# Patient Record
Sex: FEMALE | Race: BLACK OR AFRICAN AMERICAN | Employment: UNEMPLOYED | URBAN - METROPOLITAN AREA
[De-identification: names, ages, dates, MRNs, and addresses within clinical notes are randomized per-mention and may not be internally consistent; named-entity substitution may affect disease eponyms.]

---

## 2018-10-18 LAB
ANTIBODY SCREEN, EXTERNAL: NEGATIVE
ANTIBODY SCREEN, EXTERNAL: NEGATIVE
CHLAMYDIA, EXTERNAL: NEGATIVE
HBSAG, EXTERNAL: NEGATIVE
HIV, EXTERNAL: NEGATIVE
RPR, EXTERNAL: NORMAL
RUBELLA, EXTERNAL: NORMAL
RUBELLA, EXTERNAL: NORMAL
TYPE, ABO & RH, EXTERNAL: NORMAL
URINALYSIS, EXTERNAL: NEGATIVE

## 2019-01-10 ENCOUNTER — OP HISTORICAL/CONVERTED ENCOUNTER (OUTPATIENT)
Dept: OTHER | Age: 24
End: 2019-01-10

## 2019-01-25 LAB — GTT, 1 HR, GLUCOLA, EXTERNAL: 135

## 2019-04-04 LAB — GRBS, EXTERNAL: POSITIVE

## 2019-05-01 ENCOUNTER — HOSPITAL ENCOUNTER (INPATIENT)
Age: 24
LOS: 3 days | Discharge: HOME | End: 2019-05-04
Attending: STUDENT IN AN ORGANIZED HEALTH CARE EDUCATION/TRAINING PROGRAM | Admitting: OBSTETRICS & GYNECOLOGY
Payer: COMMERCIAL

## 2019-05-01 ENCOUNTER — ANESTHESIA EVENT (OUTPATIENT)
Dept: LABOR AND DELIVERY | Age: 24
End: 2019-05-01
Payer: COMMERCIAL

## 2019-05-01 ENCOUNTER — ANESTHESIA (OUTPATIENT)
Dept: LABOR AND DELIVERY | Age: 24
End: 2019-05-01
Payer: COMMERCIAL

## 2019-05-01 DIAGNOSIS — Z34.90 PREGNANCY, UNSPECIFIED GESTATIONAL AGE: Primary | ICD-10-CM

## 2019-05-01 LAB
A1 MICROGLOB PLACENTAL VAG QL: POSITIVE
CONTROL LINE PRESENT?: NORMAL
DAILY QC (YES/NO)?: YES
ERYTHROCYTE [DISTWIDTH] IN BLOOD BY AUTOMATED COUNT: 15.3 % (ref 11.5–14.5)
EXPIRATION DATE: NORMAL
HCT VFR BLD AUTO: 28.6 % (ref 35–47)
HGB BLD-MCNC: 8.9 G/DL (ref 11.5–16)
INTERNAL NEGATIVE CONTROL: NORMAL
KIT LOT NO.: NORMAL
MCH RBC QN AUTO: 25.1 PG (ref 26–34)
MCHC RBC AUTO-ENTMCNC: 31.1 G/DL (ref 30–36.5)
MCV RBC AUTO: 80.6 FL (ref 80–99)
NRBC # BLD: 0 K/UL (ref 0–0.01)
NRBC BLD-RTO: 0 PER 100 WBC
PH, VAGINAL FLUID: 4 (ref 5–6.1)
PLATELET # BLD AUTO: 146 K/UL (ref 150–400)
PMV BLD AUTO: 12.3 FL (ref 8.9–12.9)
RBC # BLD AUTO: 3.55 M/UL (ref 3.8–5.2)
WBC # BLD AUTO: 8.3 K/UL (ref 3.6–11)

## 2019-05-01 PROCEDURE — 74011000258 HC RX REV CODE- 258: Performed by: OBSTETRICS & GYNECOLOGY

## 2019-05-01 PROCEDURE — 99285 EMERGENCY DEPT VISIT HI MDM: CPT

## 2019-05-01 PROCEDURE — 76060000078 HC EPIDURAL ANESTHESIA: Performed by: ANESTHESIOLOGY

## 2019-05-01 PROCEDURE — 74011000250 HC RX REV CODE- 250

## 2019-05-01 PROCEDURE — 75410000000 HC DELIVERY VAGINAL/SINGLE: Performed by: OBSTETRICS & GYNECOLOGY

## 2019-05-01 PROCEDURE — 74011250636 HC RX REV CODE- 250/636: Performed by: OBSTETRICS & GYNECOLOGY

## 2019-05-01 PROCEDURE — 74011250636 HC RX REV CODE- 250/636: Performed by: ANESTHESIOLOGY

## 2019-05-01 PROCEDURE — 74011000250 HC RX REV CODE- 250: Performed by: ANESTHESIOLOGY

## 2019-05-01 PROCEDURE — 85027 COMPLETE CBC AUTOMATED: CPT

## 2019-05-01 PROCEDURE — 65270000029 HC RM PRIVATE

## 2019-05-01 PROCEDURE — 77030014125 HC TY EPDRL BBMI -B: Performed by: ANESTHESIOLOGY

## 2019-05-01 PROCEDURE — 84112 EVAL AMNIOTIC FLUID PROTEIN: CPT | Performed by: STUDENT IN AN ORGANIZED HEALTH CARE EDUCATION/TRAINING PROGRAM

## 2019-05-01 PROCEDURE — 59025 FETAL NON-STRESS TEST: CPT

## 2019-05-01 PROCEDURE — 75410000002 HC LABOR FEE PER 1 HR: Performed by: OBSTETRICS & GYNECOLOGY

## 2019-05-01 PROCEDURE — 4A1H74Z MONITORING OF PRODUCTS OF CONCEPTION, CARDIAC ELECTRICAL ACTIVITY, VIA NATURAL OR ARTIFICIAL OPENING: ICD-10-PCS | Performed by: OBSTETRICS & GYNECOLOGY

## 2019-05-01 PROCEDURE — 83986 ASSAY PH BODY FLUID NOS: CPT | Performed by: STUDENT IN AN ORGANIZED HEALTH CARE EDUCATION/TRAINING PROGRAM

## 2019-05-01 PROCEDURE — 77030034850

## 2019-05-01 PROCEDURE — 36415 COLL VENOUS BLD VENIPUNCTURE: CPT

## 2019-05-01 PROCEDURE — 75410000003 HC RECOV DEL/VAG/CSECN EA 0.5 HR: Performed by: OBSTETRICS & GYNECOLOGY

## 2019-05-01 PROCEDURE — 77010026065 HC OXYGEN MINIMUM MEDICAL AIR: Performed by: OBSTETRICS & GYNECOLOGY

## 2019-05-01 RX ORDER — OXYTOCIN/RINGER'S LACTATE 20/1000 ML
2 PLASTIC BAG, INJECTION (ML) INTRAVENOUS
Status: DISCONTINUED | OUTPATIENT
Start: 2019-05-01 | End: 2019-05-01

## 2019-05-01 RX ORDER — OXYTOCIN/0.9 % SODIUM CHLORIDE 30/500 ML
2 PLASTIC BAG, INJECTION (ML) INTRAVENOUS
Status: DISCONTINUED | OUTPATIENT
Start: 2019-05-01 | End: 2019-05-04 | Stop reason: HOSPADM

## 2019-05-01 RX ORDER — FENTANYL/BUPIVACAINE/NS/PF 2-1250MCG
1-16 PREFILLED PUMP RESERVOIR EPIDURAL CONTINUOUS
Status: DISCONTINUED | OUTPATIENT
Start: 2019-05-01 | End: 2019-05-02 | Stop reason: HOSPADM

## 2019-05-01 RX ORDER — ONDANSETRON 2 MG/ML
4 INJECTION INTRAMUSCULAR; INTRAVENOUS
Status: DISCONTINUED | OUTPATIENT
Start: 2019-05-01 | End: 2019-05-02 | Stop reason: HOSPADM

## 2019-05-01 RX ORDER — NALOXONE HYDROCHLORIDE 0.4 MG/ML
0.4 INJECTION, SOLUTION INTRAMUSCULAR; INTRAVENOUS; SUBCUTANEOUS AS NEEDED
Status: DISCONTINUED | OUTPATIENT
Start: 2019-05-01 | End: 2019-05-02 | Stop reason: HOSPADM

## 2019-05-01 RX ORDER — FENTANYL/BUPIVACAINE/NS/PF 2-1250MCG
1-16 PREFILLED PUMP RESERVOIR EPIDURAL CONTINUOUS
Status: DISCONTINUED | OUTPATIENT
Start: 2019-05-01 | End: 2019-05-01

## 2019-05-01 RX ORDER — LIDOCAINE HYDROCHLORIDE AND EPINEPHRINE 20; 5 MG/ML; UG/ML
INJECTION, SOLUTION EPIDURAL; INFILTRATION; INTRACAUDAL; PERINEURAL AS NEEDED
Status: DISCONTINUED | OUTPATIENT
Start: 2019-05-01 | End: 2019-05-02 | Stop reason: HOSPADM

## 2019-05-01 RX ORDER — FENTANYL/BUPIVACAINE/NS/PF 2-1250MCG
1-16 PREFILLED PUMP RESERVOIR EPIDURAL CONTINUOUS
Status: DISCONTINUED | OUTPATIENT
Start: 2019-05-01 | End: 2019-05-01 | Stop reason: SDUPTHER

## 2019-05-01 RX ORDER — NALOXONE HYDROCHLORIDE 0.4 MG/ML
0.4 INJECTION, SOLUTION INTRAMUSCULAR; INTRAVENOUS; SUBCUTANEOUS ONCE
Status: ACTIVE | OUTPATIENT
Start: 2019-05-01 | End: 2019-05-01

## 2019-05-01 RX ORDER — MAG HYDROX/ALUMINUM HYD/SIMETH 200-200-20
30 SUSPENSION, ORAL (FINAL DOSE FORM) ORAL
Status: DISCONTINUED | OUTPATIENT
Start: 2019-05-01 | End: 2019-05-02 | Stop reason: HOSPADM

## 2019-05-01 RX ORDER — ACETAMINOPHEN 325 MG/1
650 TABLET ORAL
Status: DISCONTINUED | OUTPATIENT
Start: 2019-05-01 | End: 2019-05-02 | Stop reason: HOSPADM

## 2019-05-01 RX ORDER — SODIUM CHLORIDE, SODIUM LACTATE, POTASSIUM CHLORIDE, CALCIUM CHLORIDE 600; 310; 30; 20 MG/100ML; MG/100ML; MG/100ML; MG/100ML
125 INJECTION, SOLUTION INTRAVENOUS CONTINUOUS
Status: DISCONTINUED | OUTPATIENT
Start: 2019-05-01 | End: 2019-05-04 | Stop reason: HOSPADM

## 2019-05-01 RX ORDER — NALOXONE HYDROCHLORIDE 0.4 MG/ML
0.4 INJECTION, SOLUTION INTRAMUSCULAR; INTRAVENOUS; SUBCUTANEOUS ONCE
Status: ACTIVE | OUTPATIENT
Start: 2019-05-01 | End: 2019-05-02

## 2019-05-01 RX ADMIN — PENICILLIN G POTASSIUM 2.5 MILLION UNITS: 20000000 POWDER, FOR SOLUTION INTRAVENOUS at 15:30

## 2019-05-01 RX ADMIN — SODIUM CHLORIDE, SODIUM LACTATE, POTASSIUM CHLORIDE, AND CALCIUM CHLORIDE 125 ML/HR: 600; 310; 30; 20 INJECTION, SOLUTION INTRAVENOUS at 23:07

## 2019-05-01 RX ADMIN — PENICILLIN G POTASSIUM 2.5 MILLION UNITS: 20000000 POWDER, FOR SOLUTION INTRAVENOUS at 19:32

## 2019-05-01 RX ADMIN — SODIUM CHLORIDE, SODIUM LACTATE, POTASSIUM CHLORIDE, AND CALCIUM CHLORIDE 1000 ML: 600; 310; 30; 20 INJECTION, SOLUTION INTRAVENOUS at 17:25

## 2019-05-01 RX ADMIN — OXYTOCIN-SODIUM CHLORIDE 0.9% IV SOLN 30 UNIT/500ML 2 MILLI-UNITS/MIN: 30-0.9/5 SOLUTION at 22:00

## 2019-05-01 RX ADMIN — PENICILLIN G POTASSIUM 2.5 MILLION UNITS: 20000000 POWDER, FOR SOLUTION INTRAVENOUS at 23:03

## 2019-05-01 RX ADMIN — SODIUM CHLORIDE 5 MILLION UNITS: 900 INJECTION, SOLUTION INTRAVENOUS at 11:26

## 2019-05-01 RX ADMIN — Medication 10 ML/HR: at 18:57

## 2019-05-01 RX ADMIN — LIDOCAINE HYDROCHLORIDE AND EPINEPHRINE 10 ML: 20; 5 INJECTION, SOLUTION EPIDURAL; INFILTRATION; INTRACAUDAL; PERINEURAL at 18:57

## 2019-05-01 NOTE — ANESTHESIA PROCEDURE NOTES
Epidural Block    Start time: 5/1/2019 6:22 PM  End time: 5/1/2019 7:01 PM  Performed by: Fili Rodarte MD  Authorized by: Fili Rodarte MD     Pre-Procedure  Indication: labor epidural    Preanesthetic Checklist: patient identified, risks and benefits discussed, anesthesia consent, patient being monitored, timeout performed and anesthesia consent    Timeout Time: 18:22        Epidural:   Patient position:  Seated  Prep region:  Lumbar  Prep: Betadine    Location:  L3-4    Needle and Epidural Catheter:   Needle Type:  Tuohy  Needle Gauge:  17 G  Injection Technique:  Loss of resistance using air  Attempts:  2  Catheter Size:  20 G  Catheter at Skin Depth (cm):  10  Depth in Epidural Space (cm):  5  Events: no blood with aspiration, no cerebrospinal fluid with aspiration and negative aspiration test    Test Dose:  Lidocaine 1.5% w/ epi and negative    Assessment:   Catheter Secured:  Tape  Insertion:  Uncomplicated  Patient tolerance:  Patient tolerated the procedure well with no immediate complications  Epidural placement interrupted multiple times for extended periods due to pt movement, twisting, unable to sit still. Difficult epidural to place due to repeated movement and requests to take  needle out.

## 2019-05-01 NOTE — ANESTHESIA PREPROCEDURE EVALUATION
Relevant Problems No relevant active problems Anesthetic History No history of anesthetic complications Review of Systems / Medical History Patient summary reviewed and pertinent labs reviewed Pulmonary Within defined limits Neuro/Psych Psychiatric history Comments: Previous history of depression, however no longer on any meds Cardiovascular Within defined limits Exercise tolerance: >4 METS 
  
GI/Hepatic/Renal 
Within defined limits Pertinent negatives: No GERD Endo/Other Anemia Other Findings Comments: G1 at 41 weeks, induction. No comps with pregnancy Anemia - hgb 8.9 PLt ct 146K Physical Exam 
 
Airway Mallampati: II 
TM Distance: 4 - 6 cm Neck ROM: normal range of motion Mouth opening: Normal 
 
 Cardiovascular Regular rate and rhythm,  S1 and S2 normal,  no murmur, click, rub, or gallop Rhythm: regular Rate: normal 
 
 
 
 Dental 
No notable dental hx Pulmonary Breath sounds clear to auscultation Abdominal 
 
 
 
 Other Findings Anesthetic Plan ASA: 2 Anesthesia type: epidural 
 
 
 
 
 
Anesthetic plan and risks discussed with: Patient

## 2019-05-01 NOTE — PROGRESS NOTES
0700  Pt arrived with c/o possible SROM. Did not wear pad from home. No current LOF. EFM on by Fannie Carvalho. 0740  Nitrazine negative for ROM (pH4.0) 
0745  Amnisure done. Blood on swab. Amnisure positive with bloody sample. 0904  Dr. Maye Pulliam in. Updated on pt's status. Aware that pt is GBS + and ROM could not be confirmed. SVE by MD.  No evident LOF with exam.  Per MD, have pt walk after reactive NST with pt wearing peripad. 0845  Pt up to amb. 1000  Light red blood on pad without any fluid appearance. EFM on. Dr. Luli Washington notified. Order received to admit pt. 
1050  IV started by SAKINA Fang. 1130  PCN loading dose initiated. 36  Dr. Luli Washington in to eval pt. Cx change noted by MD.  MD stated she will reeval in a few hours. Lunch given. EFM on for updated fhr eval 
1500  Bedside SBAR given to TAYLA Pitts

## 2019-05-01 NOTE — PROGRESS NOTES
Labor Progress Note Patient seen, fetal heart rate and contraction pattern evaluated at 1725 Physical Exam: 
Cervical Exam was examined   /-2, intact Uterine Activity[de-identified] q2-3 min Fetal Heart Rate: Cat 1 Assessment/Plan:  at 41w0d, labor 1. Expectant mangement 2. GBS pos, PCN 3.  Plan for epidural then AROM when comfortable. Jacob Rice DO, FACOG [facility]

## 2019-05-01 NOTE — PROGRESS NOTES
Labor Progress Note Patient seen, fetal heart rate and contraction pattern evaluated at 1740. Pt comfortable with epidural. 
 
Physical Exam: 
Cervical Exam was examined   5/80/-1 AROM thick meconium Uterine Activity[de-identified] q2-3 min Fetal Heart Rate: Cat 1 Assessment/Plan: 41w0d labor, GBS + 1. Expectant mangement 2. GBS positive.

## 2019-05-01 NOTE — H&P
History & Physical 
 
Name: Marley Wagner MRN: [id number]  SSN: [id number] YOB: 1995  Age: 26 y.o. Sex: female Subjective:  
 
Estimated Date of Delivery: 19 OB History Gudelmi [place] 1 Para Term  AB Living SAB  
   
 TAB Ectopic Molar Multiple Live Births Ms. Sera Medrano is a  admitted with pregnancy at 41w0d for Increasingly painful contractions. Prenatal course was normal. Please see prenatal records for details. Pt presented to unit this am with possible ROM. Negative nitrazine, + amnisure but + blood. No active LOF after walking with pad. Pt CE changed 1 to 3 cm, admit for labor. Past Medical History:  
Diagnosis Date  Abnormal Papanicolaou smear of cervix  Acne  Depression   
 v Hx medications prior to pregnancy No past surgical history on file. Social History Occupational History  Not on file Tobacco Use  Smoking status: Never Smoker  Smokeless tobacco: Never Used Substance and Sexual Activity  Alcohol use: No  
 Drug use: No  
 Sexual activity: Never Family History Problem Relation Age of Onset  Heart Disease Maternal Grandmother No Known Allergies Prior to Admission medications Medication Sig Start Date End Date Taking? Authorizing Provider  
cloNIDine HCl (CATAPRES) 0.2 mg tablet Take 0.2 mg by mouth nightly. Provider, Historical  
benztropine (COGENTIN) 0.5 mg tablet Take 0.5 mg by mouth nightly. Provider, Historical  
carBAMazepine (TEGRETOL) 100 mg chewable tablet Take 400 mg by mouth daily. Provider, Historical  
OLANZapine (ZYPREXA) 20 mg tablet Take 20 mg by mouth nightly. Provider, Historical  
OLANZapine (ZYPREXA) 5 mg tablet Take  by mouth nightly. Provider, Historical  
sertraline (ZOLOFT) 100 mg tablet Take  by mouth daily.     Provider, Historical  
 levonorgestrel-ethinyl estradiol (LUTERA, 28,) 0.1-20 mg-mcg per tablet Take  by mouth. Provider, Historical  
ibuprofen (MOTRIN) 600 mg tablet Take 1 Tab by mouth every six (6) hours as needed for Pain. 12/26/13   Lisa Santacruz MD  
  
 
Review of Systems: A comprehensive review of systems was negative except for that written in the HPI. Objective:  
 
Vitals: 
Vitals:  
 05/01/19 2060 05/01/19 0827 05/01/19 2156 05/01/19 1009 BP: 134/80   117/77 Pulse: 72   70 Resp:    16 Temp:  98.2 °F (36.8 °C)  98 °F (36.7 °C) Weight:   67.1 kg (148 lb) Height:   5' 2\" (1.575 m) Physical Exam: 
General NAD Abdm: gravid NT 
JUSTINE LE NT w/o edema Concow: q 3 min Membranes:  Intact Fetal Heart Rate: Cat 1 
SVE: 1/th/high upon arrival to unit at 800am, now 3/50/-2 EFW: 7 lbs Prenatal Labs:  
Lab Results Component Value Date/Time  
 Rubella, External Immune 10/18/2018 Rubella, External Immune 10/18/2018 GrBStrep, External POSITIVE 04/04/2019 HBsAg, External negative 10/18/2018 HIV, External negative 10/18/2018 RPR, External NR 10/18/2018 Chlamydia, External Negative 10/18/2018 Assessment/Plan:  
 
Plan: Admit for Labor  Continue expectant management. Group B Strep was positive, will treat prophylactically with penicillin Pt was scheduled for IOL this evening. Expectant management, will recheck later this afternoon. Signed By:  Tari Orozco,  May 1, 2019

## 2019-05-01 NOTE — PROGRESS NOTES
1515:  Received SBAR and bedsdie report from Mallorie Dong, NUVIA; assuming care of pt with IV access admitted for labor at 3/50/-2 per Dr Estevan Chappell exam at [phone]. PCN due at 1530, pt off monitor at this time 1530:  PCN up at this time. 1545:  EFM applied for IFM. Purposeful round 1610:  Visitor at bedside; Purposeful round 1634:  Pt sitting upright in bed; EFM adjusted, Purposeful round 1710:  EFM adjusted, Purposeful round, Pt to right tilt 1716:  UP to BR at this time 1725:  Dr Estevan Chappell at bedside, SVE 4/70/-2. Bolusing for epidural  
1750:  EFM adjusted; Bolus continues to infuse, orders requested from Dr Katina Michaels 1800:  EFM adjusted; Bed to upright cahir postioin;  Epidural cart to bedside. 1832:  Time out for Epidural, see Intra-procedure flowchart 1859:  Right lateral after epidural, [id number] ball, pillows efm adjusted 
1900:  Epidural Pump settings verified with Dr Sukhdeep Elias at 10 basal, bolus of 4 every 10 min, lockout of 5 per hour. Order modified. 1915:  Bedside SBAR to Nicol Foy RN; relinquishing care at this time. 1921:  Call to Dr Estevan Chappell to update on pt status, left VM. 
1932:  Dr Estevan Chappell on floor, verbal update provided.

## 2019-05-01 NOTE — PROGRESS NOTES
05/01/19 3:37 PM 
CM met with patient to complete initial assessment and to begin discharge planning. Demographics were reviewed and confirmed. Patient lives with her mother, Cecilia Panchal ([phone]), who will be her main support and will also have the 4th baby band. Neither FOB nor his family will be involved in the care of the baby. Patient noted that her mom, her dad Maritza Gottlieb, and her maternal side of her family will assist her when she and the baby get home. Patient works at [employer] and will have 2 months off from work. Patient plans to breastfeed and has a pump to use at home. She has elected to use Dr. Alma Mari for pediatric care for the baby. Patient has car seat, crib, clothing, and other necessary supplies. She has [facility] services already set up through [facility] and knows to call after she has the baby to schedule an enrollment appt for the baby. She also has Medicaid and knows that the baby will be added to this coverage. Patient noted that she has  assistance through [address] [place] [address] for childcare for when she returns to work. Patient denied any additional needs at this time. Care Management Interventions PCP Verified by CM: Yes Mode of Transport at Discharge: Self Transition of Care Consult (CM Consult): Discharge Planning Current Support Network: Family Lives Nearby, Relative's Home(Live with mother) Confirm Follow Up Transport: Family Plan discussed with Pt/Family/Caregiver: Yes Discharge Location Discharge Placement: Home with family assistance SHILPI Bustillos

## 2019-05-02 PROCEDURE — 65270000029 HC RM PRIVATE

## 2019-05-02 PROCEDURE — 75410000002 HC LABOR FEE PER 1 HR: Performed by: OBSTETRICS & GYNECOLOGY

## 2019-05-02 PROCEDURE — 74011000250 HC RX REV CODE- 250: Performed by: ANESTHESIOLOGY

## 2019-05-02 PROCEDURE — 0KQM0ZZ REPAIR PERINEUM MUSCLE, OPEN APPROACH: ICD-10-PCS | Performed by: OBSTETRICS & GYNECOLOGY

## 2019-05-02 PROCEDURE — 74011250636 HC RX REV CODE- 250/636: Performed by: OBSTETRICS & GYNECOLOGY

## 2019-05-02 PROCEDURE — 74011250637 HC RX REV CODE- 250/637: Performed by: OBSTETRICS & GYNECOLOGY

## 2019-05-02 PROCEDURE — 77030021125

## 2019-05-02 RX ORDER — DIPHENHYDRAMINE HCL 25 MG
25 CAPSULE ORAL
Status: DISCONTINUED | OUTPATIENT
Start: 2019-05-02 | End: 2019-05-04 | Stop reason: HOSPADM

## 2019-05-02 RX ORDER — SODIUM CHLORIDE 0.9 % (FLUSH) 0.9 %
5-40 SYRINGE (ML) INJECTION AS NEEDED
Status: DISCONTINUED | OUTPATIENT
Start: 2019-05-02 | End: 2019-05-04 | Stop reason: HOSPADM

## 2019-05-02 RX ORDER — IBUPROFEN 800 MG/1
800 TABLET ORAL EVERY 8 HOURS
Status: DISCONTINUED | OUTPATIENT
Start: 2019-05-02 | End: 2019-05-04 | Stop reason: HOSPADM

## 2019-05-02 RX ORDER — SWAB
1 SWAB, NON-MEDICATED MISCELLANEOUS DAILY
Status: DISCONTINUED | OUTPATIENT
Start: 2019-05-03 | End: 2019-05-04 | Stop reason: HOSPADM

## 2019-05-02 RX ORDER — PEPPERMINT OIL
SPIRIT ORAL
Status: DISPENSED
Start: 2019-05-02 | End: 2019-05-02

## 2019-05-02 RX ORDER — OXYTOCIN/RINGER'S LACTATE 20/1000 ML
125-500 PLASTIC BAG, INJECTION (ML) INTRAVENOUS ONCE
Status: ACTIVE | OUTPATIENT
Start: 2019-05-02 | End: 2019-05-03

## 2019-05-02 RX ORDER — SIMETHICONE 80 MG
80 TABLET,CHEWABLE ORAL
Status: DISCONTINUED | OUTPATIENT
Start: 2019-05-02 | End: 2019-05-04 | Stop reason: HOSPADM

## 2019-05-02 RX ORDER — SODIUM CHLORIDE 0.9 % (FLUSH) 0.9 %
5-40 SYRINGE (ML) INJECTION EVERY 8 HOURS
Status: DISCONTINUED | OUTPATIENT
Start: 2019-05-02 | End: 2019-05-04 | Stop reason: HOSPADM

## 2019-05-02 RX ORDER — DOCUSATE SODIUM 100 MG/1
100 CAPSULE, LIQUID FILLED ORAL
Status: DISCONTINUED | OUTPATIENT
Start: 2019-05-02 | End: 2019-05-04 | Stop reason: HOSPADM

## 2019-05-02 RX ORDER — SODIUM CHLORIDE, SODIUM LACTATE, POTASSIUM CHLORIDE, CALCIUM CHLORIDE 600; 310; 30; 20 MG/100ML; MG/100ML; MG/100ML; MG/100ML
125 INJECTION, SOLUTION INTRAVENOUS CONTINUOUS
Status: DISCONTINUED | OUTPATIENT
Start: 2019-05-02 | End: 2019-05-04 | Stop reason: HOSPADM

## 2019-05-02 RX ORDER — HYDROCORTISONE ACETATE PRAMOXINE HCL 2.5; 1 G/100G; G/100G
CREAM TOPICAL AS NEEDED
Status: DISCONTINUED | OUTPATIENT
Start: 2019-05-02 | End: 2019-05-04 | Stop reason: HOSPADM

## 2019-05-02 RX ORDER — NALOXONE HYDROCHLORIDE 0.4 MG/ML
0.4 INJECTION, SOLUTION INTRAMUSCULAR; INTRAVENOUS; SUBCUTANEOUS AS NEEDED
Status: DISCONTINUED | OUTPATIENT
Start: 2019-05-02 | End: 2019-05-04 | Stop reason: HOSPADM

## 2019-05-02 RX ORDER — OXYCODONE AND ACETAMINOPHEN 5; 325 MG/1; MG/1
1 TABLET ORAL
Status: DISCONTINUED | OUTPATIENT
Start: 2019-05-02 | End: 2019-05-04 | Stop reason: HOSPADM

## 2019-05-02 RX ADMIN — PENICILLIN G POTASSIUM 2.5 MILLION UNITS: 20000000 POWDER, FOR SOLUTION INTRAVENOUS at 02:56

## 2019-05-02 RX ADMIN — DOCUSATE SODIUM 100 MG: 100 CAPSULE, LIQUID FILLED ORAL at 12:20

## 2019-05-02 RX ADMIN — IBUPROFEN 800 MG: 800 TABLET ORAL at 21:15

## 2019-05-02 RX ADMIN — IBUPROFEN 800 MG: 800 TABLET ORAL at 12:21

## 2019-05-02 RX ADMIN — Medication 10 ML/HR: at 03:05

## 2019-05-02 NOTE — ROUTINE PROCESS
Bedside and Verbal shift change report given to KATY Santos RN (oncoming nurse) by Jamie Caro RN (offgoing nurse). Report included the following information SBAR, Kardex, Procedure Summary, Intake/Output, MAR and Recent Results.

## 2019-05-02 NOTE — PROGRESS NOTES
Labor Progress Note Patient seen, fetal heart rate and contraction pattern evaluated, patient examined. Patient Vitals for the past 2 hrs: 
 BP Temp Pulse Resp SpO2  
05/02/19 0052     100 % 05/02/19 0050     91 % 05/02/19 0047     98 % 05/02/19 0042     98 % 05/02/19 0038 123/72 97.9 °F (36.6 °C) 64 16   
05/02/19 0037     98 % 05/02/19 0032     98 % 05/02/19 0027     99 % 05/02/19 0024 119/75  64    
05/02/19 0022     98 % 05/02/19 0017     98 % 05/02/19 0012     98 % 05/02/19 0009 120/78  66    
05/02/19 0003     94 % Physical Exam: 
Cervical Exam:  c/pushing Uterine Activity: q 2-3 min Fetal Heart Rate: Cat 2 with good recovery between ctx. Assessment/Plan: 
 
Reassuring fetal status, Continue plan for vaginal delivery GBS pos, PCN Raeann Flores DO, FACOG [facility]

## 2019-05-02 NOTE — ROUTINE PROCESS
1900 Bedside and Verbal shift change report given to FOSTER Barnett RN (oncoming nurse) by Augusta Parekh RN (offgoing nurse). Report included the following information . Patient assessment completed. Patient is post epidural placement, comfortable. 56  Dr Kaylee Johnson is at bedside evaluation patient progress. SVE 5/80/-2. AROM  Thick meconium noted. NICU notified, they will be at delivery per MD request.  
 
2005  Joyner catheter inserted, draining well. Patient tolerated the procedure well. 2136  SVE by Dr. Kaylee Johnson 5/80/-2, unchanged. 2138  IUPC placed by Dr. Kaylee Johnson. Pitocin ordered. 2200  Pitocin started at 2 estrella-units 2230  Patient is busted up and turned to right side. Pitocin increased. 2325 Some variables noticed, patient turned to left side 0130  SVE 10/100/+1 by Solomon Salter 5832  Dr Kaylee Johnson notified. Per Dr. Kaylee Johnson, start pushing. 0138  Patients legs are in stirrups, patient is educated on how to push. Patient is baring down with every contraction. 0988  Dr Kaylee Johnson at bedside evaluating patient progress and reviewing the strip with FHR. Patient is baring down with every push. [address]  Dr Kaylee Johnson is leaving the room, patient will keep pushing. 0242  Stopped pushing. Patient is placed into upright sitting position. 5527  Patient is placed into left \"running man. \"  Hip release. 6242  Lateral right position, running man.   
 
0400 Patient is in lithotomy position, with legs in stirrups. Patient is baring down with every contraction. 200  Dr Kaylee Johnson is at bedside assessing patient progress. Keep pushing. 4386  Dr Junior Glaser. Patient will be pushing. 3377  Patient is stating \"I cant do this\", Patient is thrashing in the bed stating that she had enough. Patient wisps off BP cull. Laura Atkinson PCT in room to help. 
 
2734 Patient pushing again. This RN and Alton Ritchie PCT are encouraging the patient to push. [address] [facility] in room demonstrating how to use a towel to pull as pushing. 2369  Dr Alessandro Medina returns to room to evaluate progress. Dr Alessandro Medina is at bedside ready for delivery. Patient is baring down with each contraction. 9721  Viable male infant born via . Baby has bee taken to warmer for suctioning due to meconium. 0602  Dr Queen Rota repairing a 2nd degree laceration with stiches and wound packing. and is leaving the room. QBL 200ml  
 
0700  Bedside and Verbal shift change report given to RN (oncoming nurse) by Byron Quinn RN (offgoing nurse). Report included the following information SBAR, Kardex, Procedure Summary, Intake/Output, MAR, Accordion, Recent Results and Med Rec Status.

## 2019-05-02 NOTE — L&D DELIVERY NOTE
Delivery Note:     Patient reached FD and pushed with good effort to deliver the fetal head in OA position. no nuchal cord found. The anterior shoulder, followed by the posterior shoulder and the rest of the body then delivered easily. This was a boy with Apgars of 9 and 9 at 1 and 5 minutes respectively, weight pending. The infant was not stimulated and quickly handed to waiting nicu staff for suction after cord was doubly clamped and cut. The placenta followed spontaneously, intact, with 3VC. Pitocin was added to the IVF and the fundus was firm to palpation. The vagina, cervix and perineum were examined and 2nd degree perineal laceration with extension to left labia and vaginal wall was found repaired using 2-0 and 3-0 suture. Despite suture there was a small amount of bleeding still noted in the vagina that was made worse by manipulation. The vagina was packed and howard replaced, to be removed later today.  mL. No complications. Mom and baby doing well. Dr. Naya Miller delivering. [facility], [address] [place] [address],[address] [facility]    Delivery Summary    Patient: Adrián Sheridan MRN: [id number]  SSN: [id number]    YOB: 1995  Age: 26 y.o. Sex: female       Information for the patient's :  Crystal Oates, Male Qings Ronit [[id number]]       Labor Events:    Labor: No    Steroids: None   Cervical Ripening Date/Time:       Cervical Ripening Type: None   Antibiotics During Labor: Yes   Rupture Identifier:      Rupture Date/Time: 2019 7:48 PM   Rupture Type: AROM   Amniotic Fluid Volume: Moderate    Amniotic Fluid Description: Meconium    Amniotic Fluid Odor: None    Induction:         Induction Date/Time:        Indications for Induction:      Augmentation: Oxytocin   Augmentation Date/Time:      Indications for Augmentation:     Labor complications:          Additional complications:        Delivery Events:  Indications For Episiotomy:     Episiotomy: None   Perineal Laceration(s): 2nd   Repaired:     Periurethral Laceration Location:      Repaired:     Labial Laceration Location:     Repaired:     Sulcal Laceration Location:     Repaired:     Vaginal Laceration Location:     Repaired:     Cervical Laceration Location:     Repaired:     Repair Suture: Vicryl 2-0;Vicryl 3-0   Number of Repair Packets: 6   Estimated Blood Loss (ml):  ml     Delivery Date: 2019    Delivery Time: 5:09 AM  Delivery Type: Vaginal, Spontaneous  Sex:  Male    Gestational Age: 40w1d   Delivery Clinician:  Juanita Lewis  Living Status: Living   Delivery Location: L&D            APGARS  One minute Five minutes Ten minutes   Skin color: 1   1        Heart rate: 2   2        Grimace: 2   2        Muscle tone: 2   2        Breathin   2        Totals: 9   9            Presentation: Vertex    Position: Right Occiput Anterior  Resuscitation Method:  Suctioning-deep; Tactile Stimulation;Suctioning-bulb     Meconium Stained: Thick      Cord Information: 3 Vessels  Complications: None  Cord around:    Delayed cord clamping? No  Cord clamped date/time:   Disposition of Cord Blood: Discard    Blood Gases Sent?: No    Placenta:  Date/Time: 2019  5:12 AM  Removal: Spontaneous      Appearance: Normal      Measurements:  Birth Weight:        Birth Length:        Head Circumference:        Chest Circumference:       Abdominal Girth:       Other Providers:   Haylee GUERRERO;TAL WILLIAM;BIBI SOLOMON;ELIZABETH SEGURA;MARK LINDSAY;JUANA PEDRAZA;SABINE TORRES;SUE GREENE;CORTNEY MANRIQUE, Obstetrician;Primary Nurse;Primary  Nurse;Tech;Neonatologist;Anesthesiologist;Nursery Nurse;Tech;Charge Nurse           Group B Strep:   Lab Results   Component Value Date/Time    Alison, External POSITIVE 2019     Information for the patient's :  Joann Gillespie Male Patti Mitchell [[id number]]   No results found for: ABORH, PCTABR, PCTDIG, BILI, ABORHEXT, ABORH    No results for input(s): PCO2CB, PO2CB, HCO3I, SO2I, IBD, PTEMPI, SPECTI, PHICB, ISITE, IDEV, IALLEN in the last 72 hours.

## 2019-05-02 NOTE — LACTATION NOTE
This note was copied from a baby's chart. Mother states she tried to breastfeed baby after delivery but baby not interested. She was able to hand express 10 drops at 0830 and then she requested formula (hannah took 20 ml at 1130). She did not attend a breastfeeding class. LC reviewed breastfeeding basics, feeding cues and hand expression. Pt will successfully establish breastfeeding by feeding in response to infant's early feeding cues and/or to offer breast every 2-3 hours. Ways to obtain a deep latch and seek comfortable positioning shared, aware to keep log of feedings/output. Encouraged mom to attempt feeding with baby led feeding cues. Just as sucking on fingers, rooting, mouthing. Looking for 8-12 feedings in 24 hours. Don't limit baby at breast, allow baby to come of breast on it's own. Baby may want to feed  often and may increase number of feedings on second day of life. Skin to skin encouraged. If baby doesn't nurse,  Mom should  hand express  10-20 drops of colostrum and drip into baby's mouth, or give to baby by finger feeding, cup feeding, or spoon feeding at least every 2-3 hours. Discussed with mother her plan for feeding. Reviewed the benefits of exclusive breast milk feeding during the hospital stay. Informed her of the risks of using formula to supplement in the first few days of life as well as the benefits of successful breast milk feeding; referred her to the Breastfeeding booklet about this information. She acknowledges understanding of information reviewed and states that it is her plan to breastfeed  her infant. Will support her choice and offer additional information as needed. Hand Expression Education:  Mom taught how to manually hand express her colostrum.   Emphasized the importance of providing infant with valuable colostrum as infant rests skin to skin at breast.  Aware to avoid extended periods of non-feeding. Aware to offer 10-20+ drops of colostrum every 2-3 hours until infant is latching and nursing effectively. Taught the rationale behind this low tech but highly effective evidence based practice. Pt will successfully establish breastfeeding by feeding in response to early feeding cues  
or wake every 3h, will obtain deep latch, and will keep log of feedings/output. Taught to BF at hunger cues and or q 2-3 hrs and to offer 10-20 drops of hand expressed colostrum at any non-feeds. Breast Assessment Left Breast: Medium Left Nipple: Everted, Intact Right Breast: Medium Right Nipple: Everted, Intact Breast- Feeding Assessment Attends Breast-Feeding Classes: No(Discussed breastfeeding basics) Breast-Feeding Experience: No 
Breast Trauma/Surgery: No 
Type/Quality: Attempted(Mother states she tried to breastfeed after delivery but baby not interested. Baby goty 10 drops of hand expressed colostrum at 0830. ) Lactation Consultant Visits Breast-Feedings: (Baby had 20 ml of formula at 11:30 per mother's request. Reviewed feeding cues and hand expression.)

## 2019-05-02 NOTE — PROGRESS NOTES
05/02/19 
1:49 AM 
Spoke to Dr. Dameon Landaverde and informed her that the patient was completely dilated and pushing and asked her to review the FHR tracing.

## 2019-05-02 NOTE — DISCHARGE INSTRUCTIONS
Discharge Instructions for Vaginal Delivery    Patient ID:  Arabella Reynolds  [id number]  15 y.o.  1995    Take Home Medications       Continue taking your prenatal vitamins if you are breastfeeding. Follow-up care is a key part of your treatment and safety. Please schedule and keep appointments. Follow-up with your primary OB in 6 weeks. Activity  Avoid anything in your vagina for 6 weeks (no intercourse, tampons, or douching). You may drive unless you are taking prescription pain medications. Climbing stairs and light lifting are okay. Please avoid excessive exercise, though walking is okay- you'll be tired! Diet  Regular diet as tolerated. Be sure to drink plenty of fluids if you are breastfeeding. Wound care  If you have stitches, continue to rinse with a squirt bottle of warm water each time you void for about 7-10 days. .  Your stitches will gradually dissolve over four to eight weeks. Sitz baths are also helpful to keep the wound clean, encourage healing, and to help with pain associated with the stitches or hemorrhoids. You can use either a sitz bath basin or a bathtub filled with 2-3\" inches of plain warm water. Soak for 10 minutes 3 times a day as tolerated. Pain Management  1. Over the counter medications such as Tylenol and ibuprofen (Motrin or Advil) are ideal.  These may be taken together, alternating doses. You may  take the maximum dose:  Motrin or Advil (generic ibuprofen), either 3 tablets every 6 hours or 4 tablets every 8 hours or Tylenol (acetominophen) 1000mg every 6 hours (equivalent to 2 extra strength Tylenol). 2. You may also have a precrescription for stronger pain medication. Take only as needed and transition to over the counter medication in the next few days. Minimize amounts of the prescription medication, as it can be habit-forming and will worsen or cause constipation.  Most patients will find that within a couple of days, their pain is adequately controlled using only over-the-counter medications. 3. The prescription pain medication is mixed with Tylenol, therefore, you should not take any extra Tylenol or acetaminophen until you have reduced your prescription pain medication. 4. Add heating pad or sitz baths as needed. Add hemorrhoid wipes or ointments if needed    Constipation  1. Constipation is normal after pregnancy and delivery, especially while taking prescription narcotic pain medication. 2. Over the counter remedies including ducosate (Colace), take 1-2 capsules 1-2 times daily for soft stool as needed. You may also add/ try milk of magnesia or rectal remedies such as Dulcolax or Fleets enema. Recovery: What to Expect at Home  1. Fatigue is expected. Try to rest when you can and don't worry about doing housework or other tasks which can wait. 2. The soreness along your bottom will improve significantly over the first 2 weeks, but it may take 6 weeks before you are completely recovered. 3. Back pain or general body aches or muscle soreness are expected and should improve with acetominophen or ibuprofen. 4. Leg swelling due to pregnancy and/or IV fluids given in the hospital will take about two weeks to resolve. 5. Most women experience some form of the \"Baby Blues\" after having a baby. Feeling emotional, tearful, frustrated, anxious, sad, and irritable some of the time is normal and go away after about 2 weeks. Adequate rest and help from your family will help. Take breaks from caring for the baby. Call your doctor if your symptoms seem severe, last more than 2 weeks, or seem to be getting worse instead of better. Get help immediately if you have thoughts of wanting to hurt yourself or others! Call your doctor or seek immediate medical care if you have:  Heavy vaginal bleeding, soaking through one or more pads an hour for several hours. Foul-smelling discharge from your vagina or incision.   Consistent nausea and vomiting and cannot keep fluids down. Consistent pain that does not get better after you take pain medicine.   Sudden chest pain and shortness of breath  Signs of a blood clot: pain/ swelling/ increasing redness in your lower extremeties  Signs of infection: increased pain in your abdomen or vaginal area; red streaks, warmth, or tenderness of your breasts; fever of 100.5 F or greater

## 2019-05-02 NOTE — PROGRESS NOTES
Labor Progress Note Patient seen, fetal heart rate and contraction pattern evaluated, patient examined. Patient Vitals for the past 2 hrs: 
 BP Temp Pulse Resp SpO2  
05/01/19 2128     100 % 05/01/19 2124 106/55  74    
05/01/19 2123     100 % 05/01/19 2118     100 % 05/01/19 2113     100 % 05/01/19 2109 97/51  74    
05/01/19 2108     100 % 05/01/19 2103     100 % 05/01/19 2058     100 % 05/01/19 2053 111/62 98 °F (36.7 °C) 79 16 100 % 05/01/19 2048     100 % 05/01/19 2043     100 % 05/01/19 2040 97/61  75    
05/01/19 2038     100 % 05/01/19 2033     100 % 05/01/19 2028     100 % 05/01/19 2024 118/69  74    
05/01/19 2023     100 % 05/01/19 2018     100 % 05/01/19 2013     100 % 05/01/19 2009 98/58  81    
05/01/19 2008     100 % 05/01/19 2003     100 % 05/01/19 1958     99 % 05/01/19 1953 107/64  77  100 % 05/01/19 1948     100 % 05/01/19 1943     99 % Physical Exam: 
Cervical Exam:  unchanged Uterine Activity: q 6-7 min IUPC placed Fetal Heart Rate: cat 1 Assessment/Plan: 
 
Reassuring fetal status, Labor  Not progressing normally  start pitocin augmentation, Continue plan for vaginal delivery GBS positive Wong DO Brandy, FACOG [facility]

## 2019-05-03 PROCEDURE — 74011250637 HC RX REV CODE- 250/637: Performed by: OBSTETRICS & GYNECOLOGY

## 2019-05-03 PROCEDURE — 65270000029 HC RM PRIVATE

## 2019-05-03 RX ADMIN — OXYCODONE HYDROCHLORIDE AND ACETAMINOPHEN 1 TABLET: 5; 325 TABLET ORAL at 09:39

## 2019-05-03 RX ADMIN — IBUPROFEN 800 MG: 800 TABLET ORAL at 06:32

## 2019-05-03 RX ADMIN — Medication 1 TABLET: at 09:39

## 2019-05-03 RX ADMIN — IBUPROFEN 800 MG: 800 TABLET ORAL at 23:08

## 2019-05-03 RX ADMIN — IBUPROFEN 800 MG: 800 TABLET ORAL at 14:55

## 2019-05-03 RX ADMIN — DOCUSATE SODIUM 100 MG: 100 CAPSULE, LIQUID FILLED ORAL at 09:39

## 2019-05-03 NOTE — PROGRESS NOTES
PostPartum Note Geoffrey Beltran 
[id number] 
1995 
26 y.o. 
 
S:  Ms. Geoffrey Beltran is a 26 y.o.  PPD #1 s/p  @ 41w1d. Doing well. She had a baby boy. Her lochia is like a period. She describes her pain as mild and is well controlled with PO medications. She is breast feeding and this is going well. She is ambulating and voiding. Tolerating PO intake. O:  
Visit Vitals BP 96/55 (BP 1 Location: Right arm, BP Patient Position: At rest) Pulse 71 Temp 98 °F (36.7 °C) Resp 14 Ht 5' 2\" (1.575 m) Wt 67.1 kg (148 lb) SpO2 99% Breastfeeding? Unknown BMI 27.07 kg/m² Lab Results Component Value Date/Time WBC 8.3 2019 11:05 AM  
 HGB 8.9 (L) 2019 11:05 AM  
 HCT 28.6 (L) 2019 11:05 AM  
 PLATELET 647 (L)  11:05 AM  
 MCV 80.6 2019 11:05 AM  
 
 
Gen - No acute distress Abdomen - Fundus firm, below the umbilicus Ext - Warm, well perfused. Nontender A/P:  PPD #1 s/p  @ 41w1d doing well. 1.  Routine PP instructions/ care discussed 2. Blood type - Rh + 
3. Rubella imm 4. Circumcision desired 5. Discharge PPD#2   
6. F/U 4-6 weeks for PP check. Corinna Castellano MD 
[facility]

## 2019-05-03 NOTE — LACTATION NOTE
This note was copied from a baby's chart. Mother holding baby on her chest.  Mother states baby has been feeding well on left side in cradle hold but having difficulty with latching on the right side. Adjusted baby over right breast, baby latched on immediately in biological hold. Mothers questions answered. Reviewed breastfeeding techniques and positions with mother until found a position she was most comfortable with. Reminded mother of early feeding cues and that breast fed infants should be fed on demand without time restriction on the first breast until the infant seems satisfied. Then the second breast is offered. Advised mother to awaken  to feed if three hours have passed since baby last ate. Will continue to monitor mother's progress with breastfeeding and offer assistance at any time. Pt will successfully establish breastfeeding by feeding in response to early feeding cues  
or wake every 3h, will obtain deep latch, and will keep log of feedings/output. Taught to BF at hunger cues and or q 2-3 hrs and to offer 10-20 drops of hand expressed colostrum at any non-feeds. Breast Assessment Left Breast: Medium Left Nipple: Everted, Intact Right Breast: Medium Right Nipple: Everted, Intact Breast- Feeding Assessment Attends Breast-Feeding Classes: No(Discussed breastfeeding basics) Breast-Feeding Experience: No 
Breast Trauma/Surgery: No 
Type/Quality: Attempted(Mother states she tried to breastfeed after delivery but baby not interested. Baby goty 10 drops of hand expressed colostrum at 0830. ) Lactation Consultant Visits Breast-Feedings: Good Mother/Infant Observation Mother Observation: Breast comfortable, Recognizes feeding cues Infant Observation: Rhythmic suck, Relaxed after feeding, Opens mouth, Lips flanged, upper, Lips flanged, lower, Latches nipple and aereolae, Feeding cues LATCH Documentation Latch: Grasps breast, tongue down, lips flanged, rhythmic sucking Audible Swallowing: A few with stimulation Type of Nipple: Everted (after stimulation) Comfort (Breast/Nipple): Soft/non-tender Hold (Positioning): Full assist, teach one side, mother does other, staff holds LATCH Score: 8

## 2019-05-03 NOTE — ROUTINE PROCESS
Bedside and Verbal shift change report given to ROBERT Fisher (oncoming nurse) by Macho Vo. Pasquale Yu (offgoing nurse). Report included the following information SBAR, Procedure Summary, Intake/Output, MAR, Accordion, Recent Results and Med Rec Status.

## 2019-05-03 NOTE — ROUTINE PROCESS
Bedside and Verbal shift change report given to Manuel Knapp RN (oncoming nurse) by Ray Borjas RN (offgoing nurse). Report given with SBAR, Kardex, Intake/Output and MAR.

## 2019-05-04 VITALS
SYSTOLIC BLOOD PRESSURE: 113 MMHG | TEMPERATURE: 98.1 F | DIASTOLIC BLOOD PRESSURE: 52 MMHG | RESPIRATION RATE: 16 BRPM | HEIGHT: 62 IN | BODY MASS INDEX: 27.23 KG/M2 | OXYGEN SATURATION: 99 % | WEIGHT: 148 LBS | HEART RATE: 73 BPM

## 2019-05-04 PROCEDURE — 74011250637 HC RX REV CODE- 250/637: Performed by: OBSTETRICS & GYNECOLOGY

## 2019-05-04 RX ORDER — OXYCODONE AND ACETAMINOPHEN 5; 325 MG/1; MG/1
1 TABLET ORAL
Qty: 6 TAB | Refills: 0 | Status: SHIPPED | OUTPATIENT
Start: 2019-05-04 | End: 2019-05-07

## 2019-05-04 RX ADMIN — IBUPROFEN 800 MG: 800 TABLET ORAL at 07:01

## 2019-05-04 RX ADMIN — Medication 1 TABLET: at 09:09

## 2019-05-04 NOTE — LACTATION NOTE
This note was copied from a baby's chart. Mother holding baby. Mother states she gave formula because baby had not stooled in 24 hours. Mother states she is concerned baby isn't getting enough breast milk. Encouraged mother to feed baby since he is awake. Mother latched baby to breast, showed mother swallows. Mother able to identify swallows and active feeding. Reviewed discharge and engorgement info. Chart shows numerous feedings, void, stool WDL. Importance of monitoring outputs and feedings on first week Breastfeeding log and follow up with pediatrician visit for weight check in 1-2 days reviewed. Encouraged to call warm line number for any questions/problems that arise. Engorgement Care Guidelines:  Reviewed how milk is made and normal phases of milk production. Taught care of engorged breasts - frequent breastfeeding encouraged, cool packs and motrin as tolerated. Anticipatory guidance shared. Pt will successfully establish breastfeeding by feeding in response to early feeding cues  
or wake every 3h, will obtain deep latch, and will keep log of feedings/output. Taught to BF at hunger cues and or q 2-3 hrs and to offer 10-20 drops of hand expressed colostrum at any non-feeds. Breast Assessment Left Breast: Medium Left Nipple: Everted, Intact Right Breast: Medium Right Nipple: Everted, Intact Breast- Feeding Assessment Attends Breast-Feeding Classes: No(Discussed breastfeeding basics) Breast-Feeding Experience: No 
Breast Trauma/Surgery: No 
Type/Quality: Attempted(Mother states she tried to breastfeed after delivery but baby not interested. Baby goty 10 drops of hand expressed colostrum at 0830. ) Lactation Consultant Visits Breast-Feedings: Good Mother/Infant Observation Mother Observation: Breast comfortable, Recognizes feeding cues Infant Observation: Rhythmic suck, Relaxed after feeding, Opens mouth, Lips flanged, upper, Lips flanged, lower, Latches nipple and aereolae, Feeding cues, Audible swallows LATCH Documentation Latch: Grasps breast, tongue down, lips flanged, rhythmic sucking Audible Swallowing: Spontaneous and intermittent (24 hours old) Type of Nipple: Everted (after stimulation) Comfort (Breast/Nipple): Soft/non-tender Hold (Positioning): Full assist, teach one side, mother does other, staff holds(adjusted mother and baby into biological) LATCH Score: 9

## 2019-05-04 NOTE — PROGRESS NOTES
PostPartum Note Geoffrey Beltran 
[id number] 
1995 
26 y.o. 
 
S:  Ms. Geoffrey Beltran is a 26 y.o.  PPD #2 s/p  @ 41w1d. Doing well. She had a baby boy. Her lochia is like a period. She describes her pain as mild and is well controlled with PO medications. She is breast feeding and this is going well. She is ambulating and voiding. Tolerating PO intake. O:  
Visit Vitals /52 (BP 1 Location: Left arm, BP Patient Position: At rest;Lying right side) Pulse 73 Temp 98.1 °F (36.7 °C) Resp 16 Ht 5' 2\" (1.575 m) Wt 67.1 kg (148 lb) SpO2 99% Breastfeeding? Unknown BMI 27.07 kg/m² Lab Results Component Value Date/Time WBC 8.3 2019 11:05 AM  
 HGB 8.9 (L) 2019 11:05 AM  
 HCT 28.6 (L) 2019 11:05 AM  
 PLATELET 150 (L)  11:05 AM  
 MCV 80.6 2019 11:05 AM  
 
 
Gen - No acute distress Abdomen - Fundus firm, below the umbilicus Ext - Warm, well perfused. Nontender A/P:  PPD #2 s/p  @ 41w1d doing well. 1.  Routine PP instructions/ care discussed 2. Blood type - Rh + 
3. Rubella imm 4. Circumcision done today 5. Discharge today 6. F/U 4-6 weeks for PP check. Corinna Castellano MD 
[facility]

## 2019-05-04 NOTE — ROUTINE PROCESS
Patient discharge prescriptions & instructions given. Verbalized understanding. All questions answered. No distress noted. Signed copy of discharge instructions on paper chart. Will call to schedule a 6 week follow up with OB, sooner if needed.

## 2019-05-04 NOTE — DISCHARGE SUMMARY
Obstetrical Discharge Summary     Name: Magnus Mcburney MRN: [id number]  SSN: [id number]    YOB: 1995  Age: 26 y.o. Sex: female      Allergies: Patient has no known allergies. Admit Date: 2019    Discharge Date: 2019     Admitting Physician: Alma Dyer DO     Attending Physician:  Memo Collado DO     * Admission Diagnoses: Pregnancy [Z34.90]  Pregnancy [Z34.90]    * Discharge Diagnoses:   Information for the patient's :  Tay Frank Male Ledy Miramontes [[id number]]   Delivery of a 3.15 kg male infant via Vaginal, Spontaneous on 2019 at 5:09 AM  By Dr. Joseph Sweeney . Apgars were 9 and 9.     2nd degree laceration       Additional Diagnoses:   Hospital Problems as of 2019 Date Reviewed: 2015          Codes Class Noted - Resolved POA    Pregnancy ICD-10-CM: Z34.90  ICD-9-CM: V22.2  2019 - Present Unknown             Lab Results   Component Value Date/Time    Rubella, External Immune 10/18/2018    Rubella, External Immune 10/18/2018    GrBStrep, External POSITIVE 2019    ABO,Rh  A Positive 10/18/2018    There is no immunization history for the selected administration types on file for this patient.     * Procedures:        [place]  Depression Scale  I have been able to laugh and see the funny side of things: (P) As much as I always could  I have looked forward with enjoyment to things: (P) As much as I ever did  I have blamed myself unnecessarily when things went wrong: (P) Yes, some of the time  I have been anxious or worried for no good reason: (P) No, not at all  I have felt scared or panicky for no very good reason: (P) Yes, sometimes  Things have been getting on top of me: (P) No, I have been coping as well as ever  I have been so unhappy that I have had difficulty sleeping: (P) No, not at all  I have felt sad or miserable: (P) No, not at all  I have been so unhappy that I have been crying: (P) No, never  The thought of harming myself has occurred to me: (P) Never  Total Score: (P) 4    * Discharge Condition: good    * Hospital Course: Normal hospital course following the delivery. * Disposition: Home    Discharge Medications:   Current Discharge Medication List      START taking these medications    Details   oxyCODONE-acetaminophen (PERCOCET) 5-325 mg per tablet Take 1 Tab by mouth every four (4) hours as needed for Pain for up to 3 days. Max Daily Amount: 6 Tabs. Qty: 6 Tab, Refills: 0    Associated Diagnoses: Pregnancy, unspecified gestational age         [de-identified] these medications which have NOT CHANGED    Details   prenatal vit-calcium-iron-fa (PRENATAL PLUS, CALCIUM CARB,) 27 mg iron- 1 mg tab Take 1 Tab by mouth daily. cloNIDine HCl (CATAPRES) 0.2 mg tablet Take 0.2 mg by mouth nightly. Associated Diagnoses: Hypothyroid; Weight gain      benztropine (COGENTIN) 0.5 mg tablet Take 0.5 mg by mouth nightly. Associated Diagnoses: Hypothyroid; Weight gain      carBAMazepine (TEGRETOL) 100 mg chewable tablet Take 400 mg by mouth daily. Associated Diagnoses: Hypothyroid; Weight gain      !! OLANZapine (ZYPREXA) 20 mg tablet Take 20 mg by mouth nightly. Associated Diagnoses: Hypothyroid; Weight gain      !! OLANZapine (ZYPREXA) 5 mg tablet Take  by mouth nightly. Associated Diagnoses: Hypothyroid; Weight gain      sertraline (ZOLOFT) 100 mg tablet Take  by mouth daily. Associated Diagnoses: Hypothyroid; Weight gain      ibuprofen (MOTRIN) 600 mg tablet Take 1 Tab by mouth every six (6) hours as needed for Pain. Qty: 30 Tab, Refills: 2       !! - Potential duplicate medications found. Please discuss with provider. STOP taking these medications       levonorgestrel-ethinyl estradiol (LUTERA, 28,) 0.1-20 mg-mcg per tablet Comments:   Reason for Stopping:               * Follow-up Care/Patient Instructions:   Activity: Activity as tolerated  Diet: Regular Diet  Wound Care: As directed    Follow-up Information     Follow up With Specialties Details Why Contact Info    Debora Patricia, [address] [facility]   [facility] [address] [phone]

## 2019-05-04 NOTE — ROUTINE PROCESS
Bedside and Verbal shift change report given to ANGELINE Barajas (oncoming nurse) by Tk Castaneda RN (offgoing nurse). Report included the following information SBAR, Intake/Output, MAR and Recent Results.

## 2020-01-19 ENCOUNTER — ED HISTORICAL/CONVERTED ENCOUNTER (OUTPATIENT)
Dept: OTHER | Age: 25
End: 2020-01-19

## 2020-06-26 ENCOUNTER — IP HISTORICAL/CONVERTED ENCOUNTER (OUTPATIENT)
Dept: OTHER | Age: 25
End: 2020-06-26

## 2020-10-12 ENCOUNTER — HOSPITAL ENCOUNTER (EMERGENCY)
Age: 25
Discharge: HOME | End: 2020-10-12
Attending: EMERGENCY MEDICINE
Payer: MEDICAID

## 2020-10-12 VITALS
OXYGEN SATURATION: 100 % | TEMPERATURE: 98.2 F | BODY MASS INDEX: 23.12 KG/M2 | WEIGHT: 125.66 LBS | HEART RATE: 81 BPM | RESPIRATION RATE: 16 BRPM | HEIGHT: 62 IN | SYSTOLIC BLOOD PRESSURE: 130 MMHG | DIASTOLIC BLOOD PRESSURE: 95 MMHG

## 2020-10-12 DIAGNOSIS — R19.7 DIARRHEA OF PRESUMED INFECTIOUS ORIGIN: Primary | ICD-10-CM

## 2020-10-12 PROCEDURE — 99282 EMERGENCY DEPT VISIT SF MDM: CPT

## 2020-10-12 NOTE — ED TRIAGE NOTES
Pt thinks she got her son's GI bug.  Diarrhea with stomach discomfort starting last night. Denies fever or other symptoms.

## 2020-10-12 NOTE — LETTER
NOTIFICATION RETURN TO WORK / SCHOOL 
 
10/12/2020 Ms. Nay Vick [facility] 6 [id number] To Whom It May Concern: 
 
Nay Vick is currently under the care of [address] [place] [address]. She will return to work/school on: 10/14/2020. If there are questions or concerns please have the patient contact our office. Sincerely, EMERALD MorrisseyN, RN-BC

## 2020-10-12 NOTE — ED PROVIDER NOTES
EMERGENCY DEPARTMENT HISTORY AND PHYSICAL EXAM      Date: 10/12/2020  Patient Name: Roselia Ramos    History of Presenting Illness     Chief Complaint   Patient presents with    Diarrhea       History Provided By: Patient    HPI: Roselia Ramos, 22 y.o. female with a past medical history significant for depression presents to the ED with cc of diarrhea. States her 3year-old son had diarrhea for 24 hours ending yesterday and she began with symptoms last night. She describes watery stools occurring approximately 10 times today without any gross blood or melena. She denies any abdominal pain, only occasional nausea. No vomiting or fever. There are no other complaints, changes, or physical findings at this time. PCP: Joseph Rivera MD    No current facility-administered medications on file prior to encounter. Current Outpatient Medications on File Prior to Encounter   Medication Sig Dispense Refill    prenatal vit-calcium-iron-fa (PRENATAL PLUS, CALCIUM CARB,) 27 mg iron- 1 mg tab Take 1 Tab by mouth daily.  cloNIDine HCl (CATAPRES) 0.2 mg tablet Take 0.2 mg by mouth nightly.  benztropine (COGENTIN) 0.5 mg tablet Take 0.5 mg by mouth nightly.  carBAMazepine (TEGRETOL) 100 mg chewable tablet Take 400 mg by mouth daily.  OLANZapine (ZYPREXA) 20 mg tablet Take 20 mg by mouth nightly.  OLANZapine (ZYPREXA) 5 mg tablet Take  by mouth nightly.  sertraline (ZOLOFT) 100 mg tablet Take  by mouth daily.  ibuprofen (MOTRIN) 600 mg tablet Take 1 Tab by mouth every six (6) hours as needed for Pain. 30 Tab 2       Past History     Past Medical History:  Past Medical History:   Diagnosis Date    Abnormal Papanicolaou smear of cervix     Acne     Depression     v Hx medications prior to pregnancy       Past Surgical History:  No past surgical history on file.     Family History:  Family History   Problem Relation Age of Onset    Heart Disease Maternal Grandmother Social History:  Social History     Tobacco Use    Smoking status: Never Smoker    Smokeless tobacco: Never Used   Substance Use Topics    Alcohol use: No    Drug use: No       Allergies:  No Known Allergies      Review of Systems   Review of Systems   Constitutional: Negative for fever. Respiratory: Negative for cough. Gastrointestinal: Negative for abdominal pain, nausea and vomiting. Musculoskeletal: Negative for back pain. Neurological: Negative for dizziness and weakness. All other systems reviewed and are negative. Physical Exam   Physical Exam  Vitals signs and nursing note reviewed. Constitutional:       Appearance: Normal appearance. She is not ill-appearing. HENT:      Head: Normocephalic and atraumatic. Nose: Nose normal.      Mouth/Throat:      Mouth: Mucous membranes are moist.      Pharynx: Oropharynx is clear. No oropharyngeal exudate or posterior oropharyngeal erythema. Eyes:      General: No scleral icterus. Extraocular Movements: Extraocular movements intact. Pupils: Pupils are equal, round, and reactive to light. Neck:      Musculoskeletal: Normal range of motion and neck supple. Cardiovascular:      Rate and Rhythm: Normal rate and regular rhythm. Pulses: Normal pulses. Heart sounds: Normal heart sounds. Pulmonary:      Effort: Pulmonary effort is normal.      Breath sounds: Normal breath sounds. No wheezing, rhonchi or rales. Abdominal:      General: Bowel sounds are normal.      Palpations: Abdomen is soft. Tenderness: There is no abdominal tenderness. Musculoskeletal: Normal range of motion. Right lower leg: No edema. Left lower leg: No edema. Skin:     General: Skin is warm and dry. Capillary Refill: Capillary refill takes less than 2 seconds. Findings: No rash. Neurological:      General: No focal deficit present. Mental Status: She is alert and oriented to person, place, and time. Psychiatric:         Mood and Affect: Mood normal.         Behavior: Behavior normal.         Diagnostic Study Results     Labs -   No results found for this or any previous visit (from the past 12 hour(s)). Radiologic Studies -   No orders to display     CT Results  (Last 48 hours)    None        CXR Results  (Last 48 hours)    None            Medical Decision Making   I am the first provider for this patient. I reviewed the vital signs, available nursing notes, past medical history, past surgical history, family history and social history. Vital Signs-Reviewed the patient's vital signs. Patient Vitals for the past 12 hrs:   Temp Pulse Resp BP SpO2   10/12/20 1718 98.2 °F (36.8 °C) 81 16 (!) 130/95 100 %       Records Reviewed: Nursing Notes    Provider Notes (Medical Decision Making):   Imp: Likely viral enteritis,       ED Course:   Initial assessment performed. The patients presenting problems have been discussed, and they are in agreement with the care plan formulated and outlined with them. I have encouraged them to ask questions as they arise throughout their visit. Pt well appearing, no evidence of dehydration, discussed importance of hydration and dietary measures for diarrhea. PLAN:  1. Current Discharge Medication List        2. Follow-up Information     Follow up With Specialties Details Why Contact Info    Giselle Knox MD Pediatric Medicine  As needed [address]  [phone]          Return to ED if worse     Diagnosis     Clinical Impression:   1.  Diarrhea of presumed infectious origin

## 2020-12-09 ENCOUNTER — HOSPITAL ENCOUNTER (EMERGENCY)
Age: 25
Discharge: HOME | End: 2020-12-09
Attending: EMERGENCY MEDICINE
Payer: COMMERCIAL

## 2020-12-09 VITALS
DIASTOLIC BLOOD PRESSURE: 75 MMHG | HEIGHT: 62 IN | BODY MASS INDEX: 23.92 KG/M2 | RESPIRATION RATE: 16 BRPM | TEMPERATURE: 100.3 F | HEART RATE: 119 BPM | SYSTOLIC BLOOD PRESSURE: 115 MMHG | OXYGEN SATURATION: 99 % | WEIGHT: 130 LBS

## 2020-12-09 DIAGNOSIS — B34.9 VIRAL ILLNESS: Primary | ICD-10-CM

## 2020-12-09 LAB
FLUAV AG NPH QL IA: NEGATIVE
FLUBV AG NOSE QL IA: NEGATIVE

## 2020-12-09 PROCEDURE — 99282 EMERGENCY DEPT VISIT SF MDM: CPT

## 2020-12-09 PROCEDURE — 87804 INFLUENZA ASSAY W/OPTIC: CPT

## 2020-12-09 NOTE — ED PROVIDER NOTES
EMERGENCY DEPARTMENT HISTORY AND PHYSICAL EXAM      Date: 12/9/2020  Patient Name: Shellie Castrejon    History of Presenting Illness     Chief Complaint   Patient presents with    Fatigue       History Provided By: Patient    HPI: Shellie Castrejon, 22 y.o. female with a past medical history significant Depression presents to the ED with cc of fatigue of 2 days duration. No fever or constitutional symptoms. No cough or acute shortness of breath, no known sick contacts. There are no other complaints, changes, or physical findings at this time. PCP: Arlet Carroll MD    No current facility-administered medications on file prior to encounter. No current outpatient medications on file prior to encounter. Past History     Past Medical History:  Past Medical History:   Diagnosis Date    Abnormal Papanicolaou smear of cervix     Acne     Depression     v Hx medications prior to pregnancy       Past Surgical History:  No past surgical history on file. Family History:  Family History   Problem Relation Age of Onset    Heart Disease Maternal Grandmother        Social History:  Social History     Tobacco Use    Smoking status: Never Smoker    Smokeless tobacco: Never Used   Substance Use Topics    Alcohol use: No    Drug use: No       Allergies:  No Known Allergies      Review of Systems     Review of Systems   Constitutional: Negative. HENT: Negative. Eyes: Negative. Respiratory: Negative. Cardiovascular: Negative. Gastrointestinal: Negative. Endocrine: Negative. Genitourinary: Negative. Musculoskeletal: Negative. Skin: Negative. Neurological: Negative. Hematological: Negative. Psychiatric/Behavioral: Negative. All other systems reviewed and are negative. Physical Exam     Physical Exam  Vitals signs and nursing note reviewed. Constitutional:       Appearance: Normal appearance. She is normal weight.    HENT:      Head: Normocephalic and atraumatic. Nose: Nose normal.      Mouth/Throat:      Mouth: Mucous membranes are moist.      Pharynx: Oropharynx is clear. Eyes:      Extraocular Movements: Extraocular movements intact. Conjunctiva/sclera: Conjunctivae normal.      Pupils: Pupils are equal, round, and reactive to light. Neck:      Musculoskeletal: Normal range of motion and neck supple. Cardiovascular:      Rate and Rhythm: Normal rate and regular rhythm. Pulses: Normal pulses. Heart sounds: Normal heart sounds. Pulmonary:      Effort: Pulmonary effort is normal.      Breath sounds: Normal breath sounds. Abdominal:      General: Abdomen is flat. Palpations: Abdomen is soft. Musculoskeletal: Normal range of motion. Skin:     General: Skin is warm and dry. Capillary Refill: Capillary refill takes less than 2 seconds. Neurological:      General: No focal deficit present. Mental Status: She is alert and oriented to person, place, and time. Psychiatric:         Mood and Affect: Mood normal.         Behavior: Behavior normal.         Lab and Diagnostic Study Results     Labs -     Recent Results (from the past 24 hour(s))   INFLUENZA A & B AG (RAPID TEST)    Collection Time: 12/09/20  4:30 PM   Result Value Ref Range    Influenza A Antigen Negative Negative      Influenza B Antigen Negative Negative         Radiologic Studies -     CT Results  (Last 48 hours)    None        CXR Results  (Last 48 hours)    None            Medical Decision Making     - I am the first provider for this patient. - I reviewed the vital signs, available nursing notes, past medical history, past surgical history, family history and social history. - Initial assessment performed. The patients presenting problems have been discussed, and they are in agreement with the care plan formulated and outlined with them. I have encouraged them to ask questions as they arise throughout their visit.     Vital Signs-Reviewed the patient's vital signs. No data found. Records Reviewed: Nursing Notes    ED Course/Provider Notes (Medical Decision Making): Uneventful ED course, clinical improvement with therapy, patient will be discharged to followup with PCP as directed        Disposition     Disposition: Condition stable and improved  DC- Adult Discharges: All of the diagnostic tests were reviewed and questions answered. Diagnosis, care plan and treatment options were discussed. The patient understands the instructions and will follow up as directed. The patients results have been reviewed with them. They have been counseled regarding their diagnosis. The patient verbally convey understanding and agreement of the signs, symptoms, diagnosis, treatment and prognosis and additionally agrees to follow up as recommended with their PCP in 24 - 48 hours. They also agree with the care-plan and convey that all of their questions have been answered. I have also put together some discharge instructions for them that include: 1) educational information regarding their diagnosis, 2) how to care for their diagnosis at home, as well a 3) list of reasons why they would want to return to the ED prior to their follow-up appointment, should their condition change. DISCHARGE PLAN:  1. There are no discharge medications for this patient. 2.   Follow-up Information     Follow up With Specialties Details Why Contact Info    Juan Jose Kohli MD Pediatric Medicine In 2 days As needed [address] [place] [address]. [address] [facility]  [phone]          3. Return to ED if worse   4. There are no discharge medications for this patient. Diagnosis     Clinical Impression:   1. Viral illness        Attestations:    Keila Love MD    Please note that this dictation was completed with [employer], the [employer] voice recognition software.   Quite often unanticipated grammatical, syntax, homophones, and other interpretive errors are inadvertently transcribed by the computer software. Please disregard these errors. Please excuse any errors that have escaped final proofreading. Thank you.

## 2020-12-09 NOTE — DISCHARGE INSTRUCTIONS
Take Tylenol or Motrin over-the-counter as needed for generalized body aches. Drink plenty of fluids as tolerated.

## 2020-12-09 NOTE — LETTER
[address] [address] [place] [address] 
[phone] Work/School Note Date: 12/9/2020 To Whom It May concern: 
 
Kirstin Mathur was seen and treated today in the emergency room by the following provider(s): 
Attending Provider: Leona Magdaleno MD.   
 
Kirstin Mathur is excused from work/school on 12/09/20 and 12/10/20. She is medically clear to return to work/school on 12/11/2020. Sincerely, Mateus Alonzo MD

## 2021-07-19 LAB
ANTIBODY SCREEN, EXTERNAL: NEGATIVE
CHLAMYDIA, EXTERNAL: NEGATIVE
HBSAG, EXTERNAL: NEGATIVE
HIV, EXTERNAL: NEGATIVE
N. GONORRHEA, EXTERNAL: NEGATIVE
RPR, EXTERNAL: NONREACTIVE
RUBELLA, EXTERNAL: NORMAL

## 2021-10-13 LAB — GRBS, EXTERNAL: NEGATIVE

## 2021-11-15 ENCOUNTER — HOSPITAL ENCOUNTER (INPATIENT)
Age: 26
LOS: 2 days | Discharge: HOME | End: 2021-11-17
Attending: STUDENT IN AN ORGANIZED HEALTH CARE EDUCATION/TRAINING PROGRAM | Admitting: STUDENT IN AN ORGANIZED HEALTH CARE EDUCATION/TRAINING PROGRAM
Payer: COMMERCIAL

## 2021-11-15 PROBLEM — Z34.90 PREGNANT: Status: ACTIVE | Noted: 2021-11-15

## 2021-11-15 LAB
BASOPHILS # BLD: 0 K/UL (ref 0–0.1)
BASOPHILS NFR BLD: 0 % (ref 0–1)
COVID-19 RAPID TEST, COVR: NOT DETECTED
DIFFERENTIAL METHOD BLD: ABNORMAL
EOSINOPHIL # BLD: 0.1 K/UL (ref 0–0.4)
EOSINOPHIL NFR BLD: 1 % (ref 0–7)
ERYTHROCYTE [DISTWIDTH] IN BLOOD BY AUTOMATED COUNT: 19.3 % (ref 11.5–14.5)
HCT VFR BLD AUTO: 34 % (ref 35–47)
HGB BLD-MCNC: 11.2 G/DL (ref 11.5–16)
IMM GRANULOCYTES # BLD AUTO: 0 K/UL (ref 0–0.04)
IMM GRANULOCYTES NFR BLD AUTO: 1 % (ref 0–0.5)
LYMPHOCYTES # BLD: 2.2 K/UL (ref 0.8–3.5)
LYMPHOCYTES NFR BLD: 27 % (ref 12–49)
MCH RBC QN AUTO: 28.6 PG (ref 26–34)
MCHC RBC AUTO-ENTMCNC: 32.9 G/DL (ref 30–36.5)
MCV RBC AUTO: 87 FL (ref 80–99)
MONOCYTES # BLD: 0.8 K/UL (ref 0–1)
MONOCYTES NFR BLD: 9 % (ref 5–13)
NEUTS SEG # BLD: 5.2 K/UL (ref 1.8–8)
NEUTS SEG NFR BLD: 62 % (ref 32–75)
NRBC # BLD: 0 K/UL (ref 0–0.01)
NRBC BLD-RTO: 0 PER 100 WBC
PLATELET # BLD AUTO: 166 K/UL (ref 150–400)
RBC # BLD AUTO: 3.91 M/UL (ref 3.8–5.2)
SOURCE, COVRS: NORMAL
WBC # BLD AUTO: 8.3 K/UL (ref 3.6–11)

## 2021-11-15 PROCEDURE — 87635 SARS-COV-2 COVID-19 AMP PRB: CPT

## 2021-11-15 PROCEDURE — 86901 BLOOD TYPING SEROLOGIC RH(D): CPT

## 2021-11-15 PROCEDURE — 36415 COLL VENOUS BLD VENIPUNCTURE: CPT

## 2021-11-15 PROCEDURE — 85025 COMPLETE CBC W/AUTO DIFF WBC: CPT

## 2021-11-15 PROCEDURE — 65270000029 HC RM PRIVATE

## 2021-11-15 PROCEDURE — 75410000002 HC LABOR FEE PER 1 HR: Performed by: STUDENT IN AN ORGANIZED HEALTH CARE EDUCATION/TRAINING PROGRAM

## 2021-11-15 RX ORDER — OXYTOCIN/RINGER'S LACTATE 30/500 ML
2 PLASTIC BAG, INJECTION (ML) INTRAVENOUS
Status: DISCONTINUED | OUTPATIENT
Start: 2021-11-15 | End: 2021-11-16

## 2021-11-15 NOTE — PROGRESS NOTES
6:11 PM Hempeshameka PEPPER at bedside. Ordered NST and no other monitoring overnight until the pitocin is started. 7:00 PM Bedside and Verbal shift change report given to [facility] (oncoming nurse) by Aguila Miller (offgoing nurse). Report included the following information SBAR, Kardex, Procedure Summary, Intake/Output, MAR and Recent Results.

## 2021-11-15 NOTE — H&P
History & Physical    Name: Alyson Newman MRN: [id number]  SSN: [id number]    YOB: 1995  Age: 26 y.o. Sex: female      Subjective: Induction     Estimated Date of Delivery: 21  OB History        2    Para   1    Term   1            AB        Living   1       SAB        IAB        Ectopic        Molar        Multiple   0    Live Births   1                Ms. Umang Vieira is admitted with pregnancy at 40w6d for induction of labor due to post dates. Prenatal course was normal.  Please see prenatal records for details. Of note, anemia this pregnancy and has been receiving iron transfusions. Past Medical History:   Diagnosis Date    Abnormal Papanicolaou smear of cervix     Acne     Depression     v Hx medications prior to pregnancy     No past surgical history on file. Social History     Occupational History    Not on file   Tobacco Use    Smoking status: Never Smoker    Smokeless tobacco: Never Used   Substance and Sexual Activity    Alcohol use: No    Drug use: No    Sexual activity: Never     Family History   Problem Relation Age of Onset    Heart Disease Maternal Grandmother        No Known Allergies  Prior to Admission medications    Medication Sig Start Date End Date Taking? Authorizing Provider   Iron BisGl &PS [id number] 124-06-33-9 mg-mg-mcg-mg cap Take  by mouth. Yes Provider, Historical   PNV No.40-Iron Fum-FA Cmb No.1 27-1 mg tab Take  by mouth. Yes Provider, Historical        Review of Systems: Pertinent items are noted in the History of Present Illness. Objective:     Vitals:  Vitals:    11/15/21 1757 11/15/21 1800   BP: 107/81    Pulse: (!) 116    Resp: 20    Temp: 98.2 °F (36.8 °C)    SpO2: 100%    Weight:  63.5 kg (140 lb)   Height:  5' 2\" (1.575 m)        Physical Exam:  Patient without distress.   Heart: normal peripheral perfusion  Lung: normal respiratory effort  Abdomen: soft, nontender  Cervical Exam: 3 cm dilated    50% effaced -2 station    Lower Extremities:  - Edema No  EFW 7#  Membranes:  Intact  Fetal Heart Rate: Reactive          Prenatal Labs:   Lab Results   Component Value Date/Time    Rubella, External Immune 10/18/2018 12:00 AM    Rubella, External Immune 10/18/2018 12:00 AM    GrBStrep, External POSITIVE 04/04/2019 12:00 AM    HBsAg, External negative 10/18/2018 12:00 AM    HIV, External negative 10/18/2018 12:00 AM    RPR, External NR 10/18/2018 12:00 AM    Chlamydia, External Negative 10/18/2018 12:00 AM        Impression/Plan:     Plan: Admit for induction of labor. Group B Strep negative. - 3cm dilated so will start pitocin at 6a  - regular diet tonight  - she is anemic.  Active T&S  - COVID screening today  - consents signed, questions answered    Signed By:  Emmanuel Brito MD     November 15, 2021

## 2021-11-15 NOTE — PROGRESS NOTES
1900: Bedside, Verbal and Written shift change report given to ROBERT Benitez RN (oncoming nurse) by Marleny Dunn RN (offgoing nurse). Report included the following information SBAR, Kardex, Procedure Summary, Intake/Output, MAR, Accordion, Recent Results, Med Rec Status, Quality Measures and Dual Neuro Assessment. [id number]: RN at pt bedside rounding on pt. Pt sitting up to eat. RN to return after pt dinner to continue assessment and obtain ordered NST per Latanya Quinonez MD. Pt denies feeling contractions or pain. Pt reports positive fetal movement. FOB at pt bedside provided with linen. Call light in reach. 2320: RN removing EFM/TOCO after pt reactive NST. Pt continuing to deny feeling uterine ctx, pain and denies bleeding or LOF. FOB at pt bedside. Call light in reach. 3868: This RN at pt bedside rounding on pt and palcing EFM/TOCO monitors per pt reported nausea and contractions. 80: RN performing SVE: 6.5/70/-2. Pt tolerating well. Pt unsure if she wants epidural RN educating on epidural process and starting LR bolus per pt request.     0610: Pt coping through contractions by self-repositioning in bed and utilizing counter-pressure by FOB. EFM tracing maternal HR d/t pt positioning/ambulation. Call light in reach.      0622: RN called to bedside for pt reported pressure. Pt beginning to push through contractions. RN educating importance of waiting until cervix complete before pushing. Anibal PEDERSEN performing SVE: bulging bag. Nida notified of pt status. This RN and Anibal PEDERSEN remaining at pt bedside. 9885: Nursery RNs and Nida PEPPER at pt bedside. 1822: MD performing SVE and AROM: clear, moderate amount of fluid. SVE: 8-9cm MD also educating pt on risks of pushing with remaining cervix. 1699: MD leaving bedside. MD to return when pt ready for delivery. This RN remaining at pt bedside. 0029: RN remaining at pt bedside. RN continuing to try to monitor FHR despite difficulty d/t maternal repositioning. RN re-educating pt on risks of pushing with incomplete cervix. Pt continuing to push. 0700: Bedside, Verbal and Written shift change report given to [facility] Alicia Guillermo (oncoming nurse) by ROBERT Corral RN (offgoing nurse). Report included the following information SBAR, Kardex, Procedure Summary, Intake/Output, MAR, Accordion, Recent Results, Med Rec Status, Procedure Verification, Quality Measures and Dual Neuro Assessment.

## 2021-11-16 ENCOUNTER — ANESTHESIA (OUTPATIENT)
Dept: LABOR AND DELIVERY | Age: 26
End: 2021-11-16
Payer: COMMERCIAL

## 2021-11-16 ENCOUNTER — ANESTHESIA EVENT (OUTPATIENT)
Dept: LABOR AND DELIVERY | Age: 26
End: 2021-11-16
Payer: COMMERCIAL

## 2021-11-16 LAB
ABO + RH BLD: NORMAL
BLOOD GROUP ANTIBODIES SERPL: NORMAL
SPECIMEN EXP DATE BLD: NORMAL

## 2021-11-16 PROCEDURE — 74011000250 HC RX REV CODE- 250: Performed by: ANESTHESIOLOGY

## 2021-11-16 PROCEDURE — 74011250636 HC RX REV CODE- 250/636: Performed by: STUDENT IN AN ORGANIZED HEALTH CARE EDUCATION/TRAINING PROGRAM

## 2021-11-16 PROCEDURE — 76060000078 HC EPIDURAL ANESTHESIA: Performed by: ANESTHESIOLOGY

## 2021-11-16 PROCEDURE — 00HU33Z INSERTION OF INFUSION DEVICE INTO SPINAL CANAL, PERCUTANEOUS APPROACH: ICD-10-PCS | Performed by: STUDENT IN AN ORGANIZED HEALTH CARE EDUCATION/TRAINING PROGRAM

## 2021-11-16 PROCEDURE — 75410000000 HC DELIVERY VAGINAL/SINGLE: Performed by: STUDENT IN AN ORGANIZED HEALTH CARE EDUCATION/TRAINING PROGRAM

## 2021-11-16 PROCEDURE — 2709999900 HC NON-CHARGEABLE SUPPLY

## 2021-11-16 PROCEDURE — 74011250636 HC RX REV CODE- 250/636: Performed by: OBSTETRICS & GYNECOLOGY

## 2021-11-16 PROCEDURE — 74011250637 HC RX REV CODE- 250/637: Performed by: STUDENT IN AN ORGANIZED HEALTH CARE EDUCATION/TRAINING PROGRAM

## 2021-11-16 PROCEDURE — 74011250636 HC RX REV CODE- 250/636

## 2021-11-16 PROCEDURE — 75410000003 HC RECOV DEL/VAG/CSECN EA 0.5 HR: Performed by: STUDENT IN AN ORGANIZED HEALTH CARE EDUCATION/TRAINING PROGRAM

## 2021-11-16 PROCEDURE — 77030005513 HC CATH URETH FOL11 MDII -B

## 2021-11-16 PROCEDURE — 77030014125 HC TY EPDRL BBMI -B: Performed by: ANESTHESIOLOGY

## 2021-11-16 PROCEDURE — 65270000029 HC RM PRIVATE

## 2021-11-16 PROCEDURE — 75410000002 HC LABOR FEE PER 1 HR: Performed by: STUDENT IN AN ORGANIZED HEALTH CARE EDUCATION/TRAINING PROGRAM

## 2021-11-16 PROCEDURE — 77030019905 HC CATH URETH INTMIT MDII -A

## 2021-11-16 RX ORDER — OXYTOCIN/RINGER'S LACTATE 30/500 ML
87.3 PLASTIC BAG, INJECTION (ML) INTRAVENOUS AS NEEDED
Status: COMPLETED | OUTPATIENT
Start: 2021-11-16 | End: 2021-11-16

## 2021-11-16 RX ORDER — ACETAMINOPHEN 325 MG/1
650 TABLET ORAL
Status: DISCONTINUED | OUTPATIENT
Start: 2021-11-16 | End: 2021-11-17 | Stop reason: HOSPADM

## 2021-11-16 RX ORDER — EPHEDRINE SULFATE/0.9% NACL/PF 50 MG/5 ML
10 SYRINGE (ML) INTRAVENOUS
Status: DISCONTINUED | OUTPATIENT
Start: 2021-11-16 | End: 2021-11-16 | Stop reason: HOSPADM

## 2021-11-16 RX ORDER — OXYTOCIN/RINGER'S LACTATE 30/500 ML
PLASTIC BAG, INJECTION (ML) INTRAVENOUS
Status: COMPLETED
Start: 2021-11-16 | End: 2021-11-16

## 2021-11-16 RX ORDER — SODIUM CHLORIDE, SODIUM LACTATE, POTASSIUM CHLORIDE, CALCIUM CHLORIDE 600; 310; 30; 20 MG/100ML; MG/100ML; MG/100ML; MG/100ML
125 INJECTION, SOLUTION INTRAVENOUS CONTINUOUS
Status: DISCONTINUED | OUTPATIENT
Start: 2021-11-16 | End: 2021-11-17 | Stop reason: HOSPADM

## 2021-11-16 RX ORDER — IBUPROFEN 800 MG/1
800 TABLET ORAL
Status: DISCONTINUED | OUTPATIENT
Start: 2021-11-16 | End: 2021-11-17 | Stop reason: HOSPADM

## 2021-11-16 RX ORDER — OXYTOCIN/RINGER'S LACTATE 30/500 ML
87.3 PLASTIC BAG, INJECTION (ML) INTRAVENOUS AS NEEDED
Status: DISCONTINUED | OUTPATIENT
Start: 2021-11-16 | End: 2021-11-17 | Stop reason: HOSPADM

## 2021-11-16 RX ORDER — ZOLPIDEM TARTRATE 5 MG/1
5 TABLET ORAL
Status: DISCONTINUED | OUTPATIENT
Start: 2021-11-16 | End: 2021-11-17 | Stop reason: HOSPADM

## 2021-11-16 RX ORDER — SODIUM CHLORIDE 0.9 % (FLUSH) 0.9 %
5-40 SYRINGE (ML) INJECTION EVERY 8 HOURS
Status: DISCONTINUED | OUTPATIENT
Start: 2021-11-16 | End: 2021-11-17

## 2021-11-16 RX ORDER — ONDANSETRON 2 MG/ML
INJECTION INTRAMUSCULAR; INTRAVENOUS
Status: DISPENSED
Start: 2021-11-16 | End: 2021-11-16

## 2021-11-16 RX ORDER — NALOXONE HYDROCHLORIDE 0.4 MG/ML
0.4 INJECTION, SOLUTION INTRAMUSCULAR; INTRAVENOUS; SUBCUTANEOUS AS NEEDED
Status: DISCONTINUED | OUTPATIENT
Start: 2021-11-16 | End: 2021-11-17 | Stop reason: HOSPADM

## 2021-11-16 RX ORDER — SODIUM CHLORIDE 0.9 % (FLUSH) 0.9 %
5-40 SYRINGE (ML) INJECTION AS NEEDED
Status: DISCONTINUED | OUTPATIENT
Start: 2021-11-16 | End: 2021-11-17 | Stop reason: HOSPADM

## 2021-11-16 RX ORDER — ONDANSETRON 2 MG/ML
4 INJECTION INTRAMUSCULAR; INTRAVENOUS
Status: DISCONTINUED | OUTPATIENT
Start: 2021-11-16 | End: 2021-11-17 | Stop reason: HOSPADM

## 2021-11-16 RX ORDER — OXYTOCIN/RINGER'S LACTATE 30/500 ML
500 PLASTIC BAG, INJECTION (ML) INTRAVENOUS ONCE
Status: ACTIVE | OUTPATIENT
Start: 2021-11-16 | End: 2021-11-17

## 2021-11-16 RX ORDER — FENTANYL/BUPIVACAINE/NS/PF 2-1250MCG
12 PREFILLED PUMP RESERVOIR EPIDURAL CONTINUOUS
Status: DISCONTINUED | OUTPATIENT
Start: 2021-11-16 | End: 2021-11-16

## 2021-11-16 RX ORDER — LIDOCAINE HYDROCHLORIDE 10 MG/ML
INJECTION INFILTRATION; PERINEURAL
Status: DISCONTINUED
Start: 2021-11-16 | End: 2021-11-16 | Stop reason: WASHOUT

## 2021-11-16 RX ORDER — OXYTOCIN/RINGER'S LACTATE 30/500 ML
10 PLASTIC BAG, INJECTION (ML) INTRAVENOUS AS NEEDED
Status: DISCONTINUED | OUTPATIENT
Start: 2021-11-16 | End: 2021-11-16

## 2021-11-16 RX ORDER — OXYTOCIN/RINGER'S LACTATE 30/500 ML
10 PLASTIC BAG, INJECTION (ML) INTRAVENOUS AS NEEDED
Status: DISCONTINUED | OUTPATIENT
Start: 2021-11-16 | End: 2021-11-17 | Stop reason: HOSPADM

## 2021-11-16 RX ORDER — HYDROCORTISONE ACETATE PRAMOXINE HCL 2.5; 1 G/100G; G/100G
CREAM TOPICAL AS NEEDED
Status: DISCONTINUED | OUTPATIENT
Start: 2021-11-16 | End: 2021-11-16 | Stop reason: CLARIF

## 2021-11-16 RX ORDER — LIDOCAINE HYDROCHLORIDE AND EPINEPHRINE 20; 5 MG/ML; UG/ML
INJECTION, SOLUTION EPIDURAL; INFILTRATION; INTRACAUDAL; PERINEURAL AS NEEDED
Status: DISCONTINUED | OUTPATIENT
Start: 2021-11-16 | End: 2021-11-16 | Stop reason: HOSPADM

## 2021-11-16 RX ADMIN — LIDOCAINE HYDROCHLORIDE,EPINEPHRINE BITARTRATE 10 ML: 20; .005 INJECTION, SOLUTION EPIDURAL; INFILTRATION; INTRACAUDAL; PERINEURAL at 07:35

## 2021-11-16 RX ADMIN — IBUPROFEN 800 MG: 800 TABLET, FILM COATED ORAL at 17:53

## 2021-11-16 RX ADMIN — Medication 999 MILLI-UNITS/MIN: at 13:00

## 2021-11-16 RX ADMIN — Medication 12 ML/HR: at 08:00

## 2021-11-16 RX ADMIN — OXYTOCIN 999 MILLI-UNITS/MIN: 10 INJECTION, SOLUTION INTRAMUSCULAR; INTRAVENOUS at 13:00

## 2021-11-16 RX ADMIN — SODIUM CHLORIDE, POTASSIUM CHLORIDE, SODIUM LACTATE AND CALCIUM CHLORIDE 999 ML/HR: 600; 310; 30; 20 INJECTION, SOLUTION INTRAVENOUS at 05:40

## 2021-11-16 RX ADMIN — ONDANSETRON 4 MG: 2 INJECTION INTRAMUSCULAR; INTRAVENOUS at 05:24

## 2021-11-16 NOTE — ANESTHESIA PREPROCEDURE EVALUATION
Relevant Problems   No relevant active problems       Anesthetic History   No history of anesthetic complications            Review of Systems / Medical History  Patient summary reviewed, nursing notes reviewed and pertinent labs reviewed    Pulmonary  Within defined limits                 Neuro/Psych   Within defined limits           Cardiovascular  Within defined limits                Exercise tolerance: >4 METS     GI/Hepatic/Renal  Within defined limits              Endo/Other  Within defined limits           Other Findings              Physical Exam    Airway  Mallampati: II    Neck ROM: normal range of motion   Mouth opening: Normal     Cardiovascular  Regular rate and rhythm,  S1 and S2 normal,  no murmur, click, rub, or gallop  Rhythm: regular  Rate: normal         Dental  No notable dental hx       Pulmonary  Breath sounds clear to auscultation               Abdominal  GI exam deferred       Other Findings            Anesthetic Plan    ASA: 2  Anesthesia type: epidural      Post-op pain plan if not by surgeon: indwelling epidural catheter      Anesthetic plan and risks discussed with: Patient      Late entry - preop done, questions answered, and consent obtained prior to procedure; note entered after procedure at 7:34 AM

## 2021-11-16 NOTE — DISCHARGE INSTRUCTIONS
Discharge Instructions for Vaginal Delivery    Patient ID:  Audi Mijares  [id number]  39 y.o.  1995    Take Home Medications       Continue taking your prenatal vitamins if you are breastfeeding. Follow-up care is a key part of your treatment and safety. Please schedule and keep appointments. Follow-up with your primary OB in 6 weeks. Activity  Avoid anything in your vagina for 6 weeks (no intercourse, tampons, or douching). You may drive unless you are taking prescription pain medications. Climbing stairs and light lifting are okay. Please avoid excessive exercise, though walking is okay- you'll be tired! Diet  Regular diet as tolerated. Be sure to drink plenty of fluids if you are breastfeeding. Wound care  If you have stitches, continue to rinse with a squirt bottle of warm water each time you void for about 7-10 days. .  Your stitches will gradually dissolve over four to eight weeks. Sitz baths are also helpful to keep the wound clean, encourage healing, and to help with pain associated with the stitches or hemorrhoids. You can use either a sitz bath basin or a bathtub filled with 2-3\" inches of plain warm water. Soak for 10 minutes 3 times a day as tolerated. Pain Management  1. Over the counter medications such as Tylenol and ibuprofen (Motrin or Advil) are ideal.  These may be taken together, alternating doses. You may  take the maximum dose:  Motrin or Advil (generic ibuprofen), either 3 tablets every 6 hours or 4 tablets every 8 hours or Tylenol (acetominophen) 1000mg every 6 hours (equivalent to 2 extra strength Tylenol). 2. You may also have a precrescription for stronger pain medication. Take only as needed and transition to over the counter medication in the next few days. Minimize amounts of the prescription medication, as it can be habit-forming and will worsen or cause constipation.  Most patients will find that within a couple of days, their pain is adequately controlled using only over-the-counter medications. 3. The prescription pain medication is mixed with Tylenol, therefore, you should not take any extra Tylenol or acetaminophen until you have reduced your prescription pain medication. 4. Add heating pad or sitz baths as needed. Add hemorrhoid wipes or ointments if needed    Constipation  1. Constipation is normal after pregnancy and delivery, especially while taking prescription narcotic pain medication. 2. Over the counter remedies including ducosate (Colace), take 1-2 capsules 1-2 times daily for soft stool as needed. You may also add/ try milk of magnesia or rectal remedies such as Dulcolax or Fleets enema. Recovery: What to Expect at Home  1. Fatigue is expected. Try to rest when you can and don't worry about doing housework or other tasks which can wait. 2. The soreness along your bottom will improve significantly over the first 2 weeks, but it may take 6 weeks before you are completely recovered. 3. Back pain or general body aches or muscle soreness are expected and should improve with acetominophen or ibuprofen. 4. Leg swelling due to pregnancy and/or IV fluids given in the hospital will take about two weeks to resolve. 5. Most women experience some form of the \"Baby Blues\" after having a baby. Feeling emotional, tearful, frustrated, anxious, sad, and irritable some of the time is normal and go away after about 2 weeks. Adequate rest and help from your family will help. Take breaks from caring for the baby. Call your doctor if your symptoms seem severe, last more than 2 weeks, or seem to be getting worse instead of better. Get help immediately if you have thoughts of wanting to hurt yourself or others! Call your doctor or seek immediate medical care if you have:  Heavy vaginal bleeding, soaking through one or more pads an hour for several hours. Foul-smelling discharge from your vagina or incision.   Consistent nausea and vomiting and cannot keep fluids down. Consistent pain that does not get better after you take pain medicine.   Sudden chest pain and shortness of breath  Signs of a blood clot: pain/ swelling/ increasing redness in your lower extremeties  Signs of infection: increased pain in your abdomen or vaginal area; red streaks, warmth, or tenderness of your breasts; fever of 100.5 F or greater

## 2021-11-16 NOTE — PROGRESS NOTES
1237 Dr Odette Celis called and [facility] pt ready to deliver, states she is on her way

## 2021-11-16 NOTE — PROGRESS NOTES
Labor Progress Note  Patient seen, fetal heart rate and contraction pattern evaluated, patient examined. Patient Vitals for the past 2 hrs:   BP Pulse SpO2   11/16/21 0933   99 %   11/16/21 0928   100 %   11/16/21 0927 (!) 87/51 (!) 56        Physical Exam:  Cervical Exam:  10 cm dilated    Uterine Activity: Frequency: Every 2 minutes   Fetal Heart Rate: Reactive    Assessment/Plan:  Reassuring fetal status   Pushing, coaching patient on pushing technique. Anticipate vaginal delivery.     Toño Berkowitz MD

## 2021-11-16 NOTE — DISCHARGE SUMMARY
Obstetrical Discharge Summary     Name: Shahid Lewis MRN: [id number]  SSN: [id number]    YOB: 1995  Age: 26 y.o. Sex: female      Admit Date: 11/15/2021    Discharge Date: 2021      Attending Physician:  Jun Smith MD     Delivering Physician:  Merlyn Schwartz MD     * Admission Diagnoses:   IUP @ 41w0d         * Discharge Diagnoses:   Delivery of a Viable male infant by Brianne Reid MD on 2021. Apgars were 7 and 9. Additional Diagnoses:   Hospital Problems as of 2021 Date Reviewed: 2015          Codes Class Noted - Resolved POA    Pregnant ICD-10-CM: Z34.90  ICD-9-CM: V22.2  11/15/2021 - Present Unknown             Lab Results   Component Value Date/Time    Rubella, External Immune 2021 12:00 AM    GrBStrep, External Negative 10/13/2021 12:00 AM    There is no immunization history for the selected administration types on file for this patient. * Procedures:            * Discharge Condition: good    [facility] Course: Normal hospital course following the delivery. * Disposition: Home    Discharge Medications:   Current Discharge Medication List          * Follow-up Care/Patient Instructions: Activity: Activity as tolerated  Diet: Regular Diet  Wound Care: As directed      Followup 6 weeks for PP check        Signed By:  Luisa Stanton MD     2021                    .

## 2021-11-16 NOTE — L&D DELIVERY NOTE
Delivery Summary    Patient: Valerie Khalil MRN: [id number]  SSN: [id number]    YOB: 1995  Age: 26 y.o. Sex: female       Information for the patient's :  Delores Mcclure, Racquel Murdock Stager [[id number]]       Labor Events:    Labor: No    Steroids: None   Cervical Ripening Date/Time:       Cervical Ripening Type: None   Antibiotics During Labor: No   Rupture Identifier: Sac 1    Rupture Date/Time:       Rupture Type: AROM;SROM   Amniotic Fluid Volume: Moderate    Amniotic Fluid Description: Clear    Amniotic Fluid Odor:      Induction: AROM       Induction Date/Time: 2021 6:33 AM    Indications for Induction: Post-term Gestation    Augmentation: AROM   Augmentation Date/Time: 16:33 AM   Indications for Augmentation:     Labor complications: None       Additional complications:        Delivery Events:  Indications For Episiotomy:     Episiotomy: None   Perineal Laceration(s): None   Repaired:     Periurethral Laceration Location:      Repaired:     Labial Laceration Location:     Repaired:     Sulcal Laceration Location:     Repaired:     Vaginal Laceration Location:     Repaired:     Cervical Laceration Location:     Repaired:     Repair Suture: Vicryl 3-0   Number of Repair Packets: 1   Estimated Blood Loss (ml):  ml   Quantitative Blood Loss (ml)                Delivery Date: 2021    Delivery Time: 12:52 PM  Delivery Type: Vaginal, Spontaneous  Sex:  Male    Gestational Age: 37w0d   Delivery Clinician:  Lisa Headley  Living Status: Living   Delivery Location: L&D            APGARS  One minute Five minutes Ten minutes   Skin color: 0   1        Heart rate: 2   2        Grimace: 2   2        Muscle tone: 1   2        Breathin   2        Totals: 7   9            Presentation: Vertex    Position: Right Occiput Anterior  Resuscitation Method:  Suctioning-bulb; Tactile Stimulation     Meconium Stained: None      Cord Information: 3 Vessels  Complications: None  Cord around:    Delayed cord clamping? Yes  Cord clamped date/time:2021 12:54 PM  Disposition of Cord Blood: Discard    Blood Gases Sent?: No    Placenta:  Date/Time: 2021 12:58 PM  Removal: Expressed      Appearance: Normal     [place] Measurements:  Birth Weight:        Birth Length:        Head Circumference:        Chest Circumference:       Abdominal Girth: Other Providers:   Baudilio Patterson, Obstetrician;Primary Nurse;Primary  Nurse;Scrub Tech;Charge Nurse           Group B Strep:   Lab Results   Component Value Date/Time    GrBStrep, External Negative 10/13/2021 12:00 AM     Information for the patient's :  Shant Namrata, Male Ressie  [[id number]]   No results found for: ABORH, PCTABR, PCTDIG, BILI, ABORHEXT, ABORH     No results for input(s): PCO2CB, PO2CB, HCO3I, SO2I, IBD, PTEMPI, SPECTI, PHICB, ISITE, IDEV, IALLEN in the last 72 hours.

## 2021-11-16 NOTE — PROGRESS NOTES
I was called to the patient's room because the patient was suddenly in a lot of pain and was bearing down uncontrollably. I was informed the her SVE was complete with a BBOW. Upon my arrival to the room the patient was on all fours and was bearing down. The patient was repositioned to lithotomy position and her SVE was noted to be 8/100/-2 vtx with a BBOW. FHR:  130 moderate variability, early decels, at 1  [place]: contractions q 3-4 minutes  SVE: 8/100/-2 vtx, BBOW    The patient was given the option to continue laboring versus amniotomy with cervical reduction attempted delivery. The patient requested amniotomy. AROM- moderate amount of clear fluid    The patient was uncontrollable and would push effectively with her contractions. I then left the room so that the patient could continue to labor.

## 2021-11-16 NOTE — ROUTINE PROCESS
Bedside and verbal shift change report given to oncoming nurse, as assigned, by offgoing nurse, Sherwin Monzon RN. Report included SBAR, Kardex, I&Os, Recent Results, Procedures, MAR, and changes in patient status. Oncoming nurse and patient given opportunity for questions.

## 2021-11-16 NOTE — PROGRESS NOTES
26 - bedside report received from [facility] in [facility] for continued care at this time. Pt pushing with uterine contractions. 0715 - Pt states she wants an epidural.  Dr Isabelle Staton informed. 0 - Dr Isabelle Staton at bedside for epidural placement. Timeout performed. Pt sitting on edge of bed.  0732 - Epidural placed by MD.  EFM readjusted at this time. 3890 - Pt states she doesn't feel any contractions at this time. Pt resting comfortably with eyes closed. 1557 - Sr Jony at bedside revaluating pt status. SVE 9 cm. Pt placed in left lateral with peanut ball between legs. Pt has no c/o pain or discomfort at this time. 1020 - pt placed in right lateral position with peanut ball between legs. No c/o pain or discomfort at this time. 1048 - 10/100/+1  1053 - Started pushing per MD order. 82 Rulissette Bee [address] Dr Jordan Taveras and informed MD that pt is not pushing well at all and pt is starting to get labial swelling. MD informed that anesthesia gave orders to reduce epidural rate for better feeling for contractionsMD gave orders to allow pt to rest at this time and labor down. Pt placed in flying cowgirl. 1240 - Pt starting to feel more rectal pressure. Resume pushing at this time. 1252 - Delivery viable male. 1616 -  . TRANSFER - OUT REPORT:    Verbal report given to Mindy Tripp RN(name) on Ligia Loyola  being transferred to MIU(unit) for routine progression of care       Report consisted of patients Situation, Background, Assessment and   Recommendations(SBAR). Information from the following report(s) Intake/Output, MAR, Accordion and Recent Results was reviewed with the receiving nurse.     Lines:   Peripheral IV 11/15/21 Left; Posterior Forearm (Active)   Site Assessment Clean, dry, & intact 11/16/21 0300   Phlebitis Assessment 0 11/16/21 0300   Infiltration Assessment 0 11/16/21 0300   Dressing Status Clean, dry, & intact 11/16/21 0300   Dressing Type Tape; Transparent 11/16/21 0300   Hub Color/Line Status Pink; Capped 11/16/21 0300   Alcohol Cap Used Yes 11/16/21 0300        Opportunity for questions and clarification was provided.       Patient transported with:   Registered Nurse

## 2021-11-16 NOTE — ANESTHESIA PROCEDURE NOTES
Epidural Block    Patient location during procedure: OB  Start time: 11/16/2021 7:22 AM  End time: 11/16/2021 7:34 AM  Reason for block: labor epidural  Staffing  Performed: attending   Anesthesiologist: Jordan Jackson MD  Preanesthetic Checklist  Completed: patient identified, risks and benefits discussed and timeout performed  Block Placement  Patient position: sitting  Prep: Betadine  Sterility prep: cap, drape, gloves, gown, hand and mask  Sedation level: no sedation  Patient monitoring: continuous pulse oximetry and heart rate  Approach: midline  Location: lumbar  Lumbar location: L3-L4  Epidural  Loss of resistance technique: air  Guidance: landmark technique  Needle  Needle type: Tuohy   Needle gauge: 17 G  Needle length: 9 cm  Catheter type: multi-orifice  Catheter size: 20 G  Catheter securement method: surgical tape  Test dose: negative  Assessment  Procedure assessment: patient tolerated procedure well with no immediate complications  Additional Notes  Catheter left in epidural space 5 cm,  11    cm at skin

## 2021-11-16 NOTE — PROGRESS NOTES
11/16/2021  2:53 PM  CM met with PAULA to complete initial assessment and begin discharge planning. MOB verified and confirmed demographics. PAULA lives with FOANGELINE- Rhodia Closs, along with her children age 3 and 3, at the address on file. PAULA does not work and plans to be home with infant. FOANGELINE is employed and will be taking adequate time off. PAULA reports she has good family support, and feels like she has the support she needs when she returns home. PAULA plans to bottle feed baby. [facility] will provide follow up care for infant. PAULA has car seat, bassinet/crib, clothing, bottles and all necessary supplies for baby. PAULA has BCBS and Medicaid, and will be adding baby to [facility] policy. CM discussed process to add baby to insurance, MOB verbalized understanding. MOB would like information for [facility] services as well. CM provided MOB with info. Care Management Interventions  PCP Verified by CM: Yes Nica Bender)  Mode of Transport at Discharge:  Other (see comment)  Transition of Care Consult (CM Consult): Discharge Planning  Support Systems: Other Family Member(s), Spouse/Significant Other  Confirm Follow Up Transport: Family  Discharge Location  Discharge Placement: Home with family assistance  Deloris Armando

## 2021-11-17 VITALS
HEART RATE: 71 BPM | HEIGHT: 62 IN | SYSTOLIC BLOOD PRESSURE: 130 MMHG | OXYGEN SATURATION: 98 % | RESPIRATION RATE: 16 BRPM | WEIGHT: 140 LBS | DIASTOLIC BLOOD PRESSURE: 87 MMHG | BODY MASS INDEX: 25.76 KG/M2 | TEMPERATURE: 98.3 F

## 2021-11-17 PROCEDURE — 74011250637 HC RX REV CODE- 250/637: Performed by: STUDENT IN AN ORGANIZED HEALTH CARE EDUCATION/TRAINING PROGRAM

## 2021-11-17 PROCEDURE — 74011250636 HC RX REV CODE- 250/636: Performed by: STUDENT IN AN ORGANIZED HEALTH CARE EDUCATION/TRAINING PROGRAM

## 2021-11-17 PROCEDURE — 91303 HC RX REV CODE- 250/636: CPT | Performed by: STUDENT IN AN ORGANIZED HEALTH CARE EDUCATION/TRAINING PROGRAM

## 2021-11-17 RX ADMIN — IBUPROFEN 800 MG: 800 TABLET, FILM COATED ORAL at 10:20

## 2021-11-17 RX ADMIN — IBUPROFEN 800 MG: 800 TABLET, FILM COATED ORAL at 18:27

## 2021-11-17 RX ADMIN — IBUPROFEN 800 MG: 800 TABLET, FILM COATED ORAL at 01:41

## 2021-11-17 RX ADMIN — JNJ-78436735 0.5 ML: 50000000000 SUSPENSION INTRAMUSCULAR at 16:52

## 2021-11-17 NOTE — PROGRESS NOTES
Administered COVID vaccine without difficulty in Right deltoid. facesheet reviewed and attestation signed. Card given to patient to keep in a safe place.

## 2021-11-17 NOTE — PROGRESS NOTES
PostPartum Note    Chester Wilkerson  [id number]  1995  26 y.o.    S:  Ms. Chester Wilkerson is a 26 y.o.  PPD #1 s/p  @ 41w0d. Doing well. She had a baby boy. Her lochia is like a period. She describes her pain as mild and is well controlled with PO medications. She is ambulating and voiding. Tolerating PO intake. O:   Visit Vitals  /64 (BP 1 Location: Right upper arm, BP Patient Position: At rest)   Pulse (!) 55   Temp 98.9 °F (37.2 °C)   Resp 15   Ht 5' 2\" (1.575 m)   Wt 63.5 kg (140 lb)   SpO2 99%   Breastfeeding Unknown   BMI 25.61 kg/m²       Lab Results   Component Value Date/Time    WBC 8.3 11/15/2021 05:50 PM    HGB 11.2 (L) 11/15/2021 05:50 PM    HCT 34.0 (L) 11/15/2021 05:50 PM    PLATELET 842 [id number] 05:50 PM    MCV 87.0 11/15/2021 05:50 PM       Gen - No acute distress  Abdomen - Fundus firm, below the umbilicus   Ext - Warm, well perfused. Nontender    A/P:  PPD #1 s/p  @ 41w0d doing well. 1.  Routine PP instructions/ care discussed  2. Blood type - Rh +  3. Rubella imm  4. Circumcision - desired. Will plan today    5. Discharge - desired. Delivery after noon. Will workup. 6.  F/U 4-6 weeks for PP check.       Jonah Ruiz MD  [facility]

## 2021-11-17 NOTE — PROGRESS NOTES
4:11 PM Spoke to Dr. Riaz Ross over the telephone. She states that patient is welcome to go ahead and get the Raymondo Keaton and Raymondo Keaton vaccine while here at the hospital. Per Dr. Riaz Ross, patient should be encouraged to seek the Jessy Gonzalez or Rios Reese vaccine outpatient since J&J is the only one offered by the hospital, but she is okay with patient receiving the J&J vaccine if that is her desire. Mariusz Callaway, primary RN, will consult with patient to find out if she wants this vaccine or wants to get a different 's vaccine outpatient. 4:12 PM Patient states she does want the J&J vaccine.

## 2021-11-18 NOTE — PROGRESS NOTES
Pt discharged home. Discharge instructions and education completed and patient reported she had no more questions. Bands verified on patients and infant, see footprint sheet.

## 2021-11-18 NOTE — ROUTINE PROCESS
Bedside and verbal shift change report given to oncoming nurse, as assigned, by offgoing nurse, Bianca Smith RN. Report included SBAR, Kardex, I&Os, Recent Results, Procedures, MAR, and changes in patient status. Oncoming nurse and patient given opportunity for questions.

## 2022-03-19 PROBLEM — Z34.90 PREGNANCY: Status: ACTIVE | Noted: 2019-05-01

## 2022-03-19 PROBLEM — Z34.90 PREGNANT: Status: ACTIVE | Noted: 2021-11-15

## 2023-05-05 ENCOUNTER — HOSPITAL ENCOUNTER (EMERGENCY)
Age: 28
Discharge: HOME | End: 2023-05-05
Attending: EMERGENCY MEDICINE
Payer: MEDICAID

## 2023-05-05 VITALS
HEIGHT: 62 IN | DIASTOLIC BLOOD PRESSURE: 69 MMHG | HEART RATE: 86 BPM | SYSTOLIC BLOOD PRESSURE: 123 MMHG | OXYGEN SATURATION: 98 % | TEMPERATURE: 98.5 F | RESPIRATION RATE: 18 BRPM | WEIGHT: 120 LBS | BODY MASS INDEX: 22.08 KG/M2

## 2023-05-05 DIAGNOSIS — N30.00 ACUTE CYSTITIS WITHOUT HEMATURIA: ICD-10-CM

## 2023-05-05 DIAGNOSIS — R11.2 NAUSEA AND VOMITING, UNSPECIFIED VOMITING TYPE: Primary | ICD-10-CM

## 2023-05-05 LAB
ALBUMIN SERPL-MCNC: 4.1 G/DL (ref 3.5–5)
ALBUMIN/GLOB SERPL: 1 (ref 1.1–2.2)
ALP SERPL-CCNC: 53 U/L (ref 45–117)
ALT SERPL-CCNC: 24 U/L (ref 12–78)
ANION GAP SERPL CALC-SCNC: 2 MMOL/L (ref 5–15)
APPEARANCE UR: ABNORMAL
AST SERPL-CCNC: 14 U/L (ref 15–37)
BACTERIA URNS QL MICRO: ABNORMAL /HPF
BASOPHILS # BLD: 0 K/UL (ref 0–0.1)
BASOPHILS NFR BLD: 0 % (ref 0–1)
BILIRUB SERPL-MCNC: 1 MG/DL (ref 0.2–1)
BILIRUB UR QL: NEGATIVE
BUN SERPL-MCNC: 12 MG/DL (ref 6–20)
BUN/CREAT SERPL: 17 (ref 12–20)
CALCIUM SERPL-MCNC: 8.9 MG/DL (ref 8.5–10.1)
CHLORIDE SERPL-SCNC: 110 MMOL/L (ref 97–108)
CO2 SERPL-SCNC: 24 MMOL/L (ref 21–32)
COLOR UR: ABNORMAL
COMMENT, HOLDF: NORMAL
CREAT SERPL-MCNC: 0.69 MG/DL (ref 0.55–1.02)
DIFFERENTIAL METHOD BLD: ABNORMAL
EOSINOPHIL # BLD: 0.3 K/UL (ref 0–0.4)
EOSINOPHIL NFR BLD: 2 % (ref 0–7)
EPITH CASTS URNS QL MICRO: ABNORMAL /LPF
ERYTHROCYTE [DISTWIDTH] IN BLOOD BY AUTOMATED COUNT: 15.7 % (ref 11.5–14.5)
GLOBULIN SER CALC-MCNC: 4.1 G/DL (ref 2–4)
GLUCOSE SERPL-MCNC: 105 MG/DL (ref 65–100)
GLUCOSE UR STRIP.AUTO-MCNC: NEGATIVE MG/DL
HCG UR QL: NEGATIVE
HCT VFR BLD AUTO: 40.3 % (ref 35–47)
HGB BLD-MCNC: 13.1 G/DL (ref 11.5–16)
HGB UR QL STRIP: NEGATIVE
HYALINE CASTS URNS QL MICRO: ABNORMAL /LPF (ref 0–2)
IMM GRANULOCYTES # BLD AUTO: 0 K/UL (ref 0–0.04)
IMM GRANULOCYTES NFR BLD AUTO: 0 % (ref 0–0.5)
KETONES UR QL STRIP.AUTO: NEGATIVE MG/DL
LEUKOCYTE ESTERASE UR QL STRIP.AUTO: ABNORMAL
LIPASE SERPL-CCNC: 342 U/L (ref 73–393)
LYMPHOCYTES # BLD: 1.8 K/UL (ref 0.8–3.5)
LYMPHOCYTES NFR BLD: 14 % (ref 12–49)
MCH RBC QN AUTO: 28.2 PG (ref 26–34)
MCHC RBC AUTO-ENTMCNC: 32.5 G/DL (ref 30–36.5)
MCV RBC AUTO: 86.9 FL (ref 80–99)
MONOCYTES # BLD: 0.6 K/UL (ref 0–1)
MONOCYTES NFR BLD: 5 % (ref 5–13)
NEUTS SEG # BLD: 10 K/UL (ref 1.8–8)
NEUTS SEG NFR BLD: 79 % (ref 32–75)
NITRITE UR QL STRIP.AUTO: POSITIVE
NRBC # BLD: 0 K/UL (ref 0–0.01)
NRBC BLD-RTO: 0 PER 100 WBC
PH UR STRIP: 5.5 (ref 5–8)
PLATELET # BLD AUTO: 286 K/UL (ref 150–400)
PMV BLD AUTO: 11.9 FL (ref 8.9–12.9)
POTASSIUM SERPL-SCNC: 4.2 MMOL/L (ref 3.5–5.1)
PROT SERPL-MCNC: 8.2 G/DL (ref 6.4–8.2)
PROT UR STRIP-MCNC: ABNORMAL MG/DL
RBC # BLD AUTO: 4.64 M/UL (ref 3.8–5.2)
RBC #/AREA URNS HPF: ABNORMAL /HPF (ref 0–5)
SAMPLES BEING HELD,HOLD: NORMAL
SODIUM SERPL-SCNC: 136 MMOL/L (ref 136–145)
SP GR UR REFRACTOMETRY: 1.02 (ref 1–1.03)
UR CULT HOLD, URHOLD: NORMAL
UROBILINOGEN UR QL STRIP.AUTO: 0.2 EU/DL (ref 0.2–1)
WBC # BLD AUTO: 12.8 K/UL (ref 3.6–11)
WBC URNS QL MICRO: ABNORMAL /HPF (ref 0–4)

## 2023-05-05 PROCEDURE — 83690 ASSAY OF LIPASE: CPT

## 2023-05-05 PROCEDURE — 80053 COMPREHEN METABOLIC PANEL: CPT

## 2023-05-05 PROCEDURE — 96361 HYDRATE IV INFUSION ADD-ON: CPT

## 2023-05-05 PROCEDURE — 81025 URINE PREGNANCY TEST: CPT

## 2023-05-05 PROCEDURE — 74011250636 HC RX REV CODE- 250/636: Performed by: STUDENT IN AN ORGANIZED HEALTH CARE EDUCATION/TRAINING PROGRAM

## 2023-05-05 PROCEDURE — 99284 EMERGENCY DEPT VISIT MOD MDM: CPT

## 2023-05-05 PROCEDURE — 96374 THER/PROPH/DIAG INJ IV PUSH: CPT

## 2023-05-05 PROCEDURE — 96375 TX/PRO/DX INJ NEW DRUG ADDON: CPT

## 2023-05-05 PROCEDURE — 81001 URINALYSIS AUTO W/SCOPE: CPT

## 2023-05-05 PROCEDURE — 87086 URINE CULTURE/COLONY COUNT: CPT

## 2023-05-05 PROCEDURE — 85025 COMPLETE CBC W/AUTO DIFF WBC: CPT

## 2023-05-05 PROCEDURE — 36415 COLL VENOUS BLD VENIPUNCTURE: CPT

## 2023-05-05 RX ORDER — CEPHALEXIN 500 MG/1
500 CAPSULE ORAL 2 TIMES DAILY
Qty: 14 CAPSULE | Refills: 0 | Status: SHIPPED | OUTPATIENT
Start: 2023-05-05 | End: 2023-05-12

## 2023-05-05 RX ORDER — ONDANSETRON 2 MG/ML
4 INJECTION INTRAMUSCULAR; INTRAVENOUS ONCE
Status: COMPLETED | OUTPATIENT
Start: 2023-05-05 | End: 2023-05-05

## 2023-05-05 RX ORDER — KETOROLAC TROMETHAMINE 30 MG/ML
30 INJECTION, SOLUTION INTRAMUSCULAR; INTRAVENOUS ONCE
Status: COMPLETED | OUTPATIENT
Start: 2023-05-05 | End: 2023-05-05

## 2023-05-05 RX ORDER — ONDANSETRON 4 MG/1
4 TABLET, ORALLY DISINTEGRATING ORAL
Qty: 20 TABLET | Refills: 0 | Status: SHIPPED | OUTPATIENT
Start: 2023-05-05

## 2023-05-05 RX ADMIN — SODIUM CHLORIDE 1000 ML: 9 INJECTION, SOLUTION INTRAVENOUS at 09:38

## 2023-05-05 RX ADMIN — ONDANSETRON 4 MG: 2 INJECTION INTRAMUSCULAR; INTRAVENOUS at 09:42

## 2023-05-05 RX ADMIN — KETOROLAC TROMETHAMINE 30 MG: 30 INJECTION, SOLUTION INTRAMUSCULAR at 09:42

## 2023-05-07 LAB
BACTERIA SPEC CULT: ABNORMAL
BACTERIA SPEC CULT: ABNORMAL
CC UR VC: ABNORMAL
SERVICE CMNT-IMP: ABNORMAL

## 2024-03-18 ENCOUNTER — HOSPITAL ENCOUNTER (OUTPATIENT)
Facility: HOSPITAL | Age: 29
Discharge: HOME | End: 2024-03-19
Attending: OBSTETRICS & GYNECOLOGY | Admitting: OBSTETRICS & GYNECOLOGY
Payer: MEDICAID

## 2024-03-18 PROBLEM — Z3A.32 32 WEEKS GESTATION OF PREGNANCY: Status: ACTIVE | Noted: 2024-03-18

## 2024-03-18 PROBLEM — O47.9 IRREGULAR UTERINE CONTRACTIONS: Status: ACTIVE | Noted: 2024-03-18

## 2024-03-18 PROBLEM — Z64.1 MULTIPARITY: Status: ACTIVE | Noted: 2024-03-18

## 2024-03-18 PROBLEM — O09.33 NO PRENATAL CARE IN CURRENT PREGNANCY IN THIRD TRIMESTER: Status: ACTIVE | Noted: 2024-03-18

## 2024-03-18 PROCEDURE — 6370000000 HC RX 637 (ALT 250 FOR IP): Performed by: OBSTETRICS & GYNECOLOGY

## 2024-03-18 PROCEDURE — 2580000003 HC RX 258: Performed by: OBSTETRICS & GYNECOLOGY

## 2024-03-18 PROCEDURE — 4500000002 HC ER NO CHARGE

## 2024-03-18 PROCEDURE — 87147 CULTURE TYPE IMMUNOLOGIC: CPT

## 2024-03-18 PROCEDURE — 87086 URINE CULTURE/COLONY COUNT: CPT

## 2024-03-18 PROCEDURE — 81001 URINALYSIS AUTO W/SCOPE: CPT

## 2024-03-18 RX ORDER — ACETAMINOPHEN 500 MG
1000 TABLET ORAL ONCE
Status: COMPLETED | OUTPATIENT
Start: 2024-03-18 | End: 2024-03-18

## 2024-03-18 RX ORDER — SODIUM CHLORIDE, SODIUM LACTATE, POTASSIUM CHLORIDE, CALCIUM CHLORIDE 600; 310; 30; 20 MG/100ML; MG/100ML; MG/100ML; MG/100ML
INJECTION, SOLUTION INTRAVENOUS ONCE
Status: COMPLETED | OUTPATIENT
Start: 2024-03-18 | End: 2024-03-18

## 2024-03-18 RX ADMIN — SODIUM CHLORIDE, POTASSIUM CHLORIDE, SODIUM LACTATE AND CALCIUM CHLORIDE: 600; 310; 30; 20 INJECTION, SOLUTION INTRAVENOUS at 22:48

## 2024-03-18 RX ADMIN — ACETAMINOPHEN 1000 MG: 500 TABLET ORAL at 23:13

## 2024-03-18 ASSESSMENT — PAIN SCALES - GENERAL: PAINLEVEL_OUTOF10: 6

## 2024-03-18 ASSESSMENT — PAIN DESCRIPTION - DESCRIPTORS: DESCRIPTORS: SHARP

## 2024-03-18 ASSESSMENT — PAIN DESCRIPTION - ORIENTATION: ORIENTATION: LOWER

## 2024-03-18 ASSESSMENT — PAIN DESCRIPTION - LOCATION: LOCATION: BACK

## 2024-03-19 VITALS
RESPIRATION RATE: 16 BRPM | SYSTOLIC BLOOD PRESSURE: 100 MMHG | HEART RATE: 90 BPM | DIASTOLIC BLOOD PRESSURE: 61 MMHG | OXYGEN SATURATION: 99 % | TEMPERATURE: 97.7 F

## 2024-03-19 LAB
APPEARANCE UR: ABNORMAL
BACTERIA URNS QL MICRO: NEGATIVE /HPF
BILIRUB UR QL: NEGATIVE
CAOX CRY URNS QL MICRO: ABNORMAL
COLOR UR: ABNORMAL
EPITH CASTS URNS QL MICRO: ABNORMAL /LPF
GLUCOSE UR STRIP.AUTO-MCNC: NEGATIVE MG/DL
HGB UR QL STRIP: NEGATIVE
KETONES UR QL STRIP.AUTO: NEGATIVE MG/DL
LEUKOCYTE ESTERASE UR QL STRIP.AUTO: ABNORMAL
MUCOUS THREADS URNS QL MICRO: ABNORMAL /LPF
NITRITE UR QL STRIP.AUTO: NEGATIVE
PH UR STRIP: 5 (ref 5–8)
PROT UR STRIP-MCNC: NEGATIVE MG/DL
RBC #/AREA URNS HPF: ABNORMAL /HPF (ref 0–5)
SP GR UR REFRACTOMETRY: 1.03 (ref 1–1.03)
URINE CULTURE IF INDICATED: ABNORMAL
UROBILINOGEN UR QL STRIP.AUTO: 4 EU/DL (ref 0.1–1)
WBC URNS QL MICRO: ABNORMAL /HPF (ref 0–4)

## 2024-03-19 PROCEDURE — 99213 OFFICE O/P EST LOW 20 MIN: CPT

## 2024-03-19 NOTE — PROCEDURES
NON-STRESS TEST (NST)    DATE OF NST:  3/18/2024    An NST was performed on this patient which I personally reviewed and interpreted.    INDICATION:    Chief Complaint   Patient presents with    Abdominal Pain    Contractions     Started at 2150 and has since stopped       GESTATIONAL AGE:  32w1d    PROBLEM LIST:    Patient Active Problem List   Diagnosis    No prenatal care in current pregnancy in third trimester    Irregular uterine contractions    Multiparity    32 week prematurity       Duration of Testin minutes    FHTs:  Baseline BPM:  Variability: Moderate  Accelerations: Present 15x15    Decelerations: Absent  Category: 1  Reactivity:  REACTIVE    CONTRACTIONS:  Monitored by:  TOCO  Frequency: None  Duration:   n/a  Intensitiy:  n/a   Resting Tone:  Relaxed    EFW: Less than 4500g      Darryl Ferrera MD FACOG  OB/GYN Hospitalist

## 2024-03-19 NOTE — H&P
followup care.     *Please note that M*Modal Fluency Direct / Dragon voice recognition software was used to dictate this note.  Dictation errors, grammatical errors, and incorrect pronouns may have occurred during the electronic transcription process. The medical provider for this chart reserves the right to correct aforementioned errors at a later time as necessary. If any clarifications are needed regarding the documentation of care provided to this patient, please contact the medical provider of this patient.     *If ultrasound is performed at bedside independent of the [facility] Radiology Department with L&D portable ultrasound unit, there is no capability for photos or video for chart documentation.

## 2024-03-19 NOTE — PROGRESS NOTES
2213   Patient presented to LDR5 and was oriented to the room. Patient up to the bathroom to provide a urine sample.     2221  EFM initiated.     2245  IV placed.    2248   LR bolus started by gravity.    2305 Iban CADET at bedside. EFM tracing reviewed.     2308 Cervical exam by Iban CADET - Cervix Closed Long and High     2313  Tylenol given.     2328   LR bolus complete.      0041   EFM discontinued. Patient up and getting dressed for discharge.     0052  IV removed and discharge instructions reviewed with patient.     0106   Patient ambulated off the unit to go home.

## 2024-03-20 LAB
BACTERIA SPEC CULT: ABNORMAL
BACTERIA SPEC CULT: ABNORMAL
COLONY COUNT, CNT: ABNORMAL
COLONY COUNT, CNT: ABNORMAL
Lab: ABNORMAL

## 2024-06-28 ENCOUNTER — HOSPITAL ENCOUNTER (INPATIENT)
Facility: HOSPITAL | Age: 29
LOS: 2 days | Discharge: HOME | DRG: 560 | End: 2024-06-30
Attending: OBSTETRICS & GYNECOLOGY | Admitting: OBSTETRICS & GYNECOLOGY
Payer: MEDICAID

## 2024-06-28 PROBLEM — O26.899 ABDOMINAL PAIN AFFECTING PREGNANCY: Status: ACTIVE | Noted: 2024-06-28

## 2024-06-28 PROBLEM — R10.9 ABDOMINAL PAIN AFFECTING PREGNANCY: Status: ACTIVE | Noted: 2024-06-28

## 2024-06-28 LAB
ABO + RH BLD: NORMAL
BLOOD GROUP ANTIBODIES SERPL: NEGATIVE
ERYTHROCYTE [DISTWIDTH] IN BLOOD BY AUTOMATED COUNT: 18.6 % (ref 11.5–14.5)
HCT VFR BLD AUTO: 32.2 % (ref 35–47)
HGB BLD-MCNC: 10.1 G/DL (ref 11.5–16)
MCH RBC QN AUTO: 23.8 PG (ref 26–34)
MCHC RBC AUTO-ENTMCNC: 31.4 G/DL (ref 30–36.5)
MCV RBC AUTO: 75.9 FL (ref 80–99)
NRBC # BLD: 0 K/UL (ref 0–0.01)
NRBC BLD-RTO: 0 PER 100 WBC
PLATELET # BLD AUTO: 172 K/UL (ref 150–400)
RBC # BLD AUTO: 4.24 M/UL (ref 3.8–5.2)
RPR SER QL: NONREACTIVE
SPECIMEN EXP DATE BLD: NORMAL
WBC # BLD AUTO: 10.6 K/UL (ref 3.6–11)

## 2024-06-28 PROCEDURE — 86900 BLOOD TYPING SEROLOGIC ABO: CPT

## 2024-06-28 PROCEDURE — 6360000002 HC RX W HCPCS

## 2024-06-28 PROCEDURE — 7220000101 HC DELIVERY VAGINAL/SINGLE

## 2024-06-28 PROCEDURE — 85027 COMPLETE CBC AUTOMATED: CPT

## 2024-06-28 PROCEDURE — 6360000002 HC RX W HCPCS: Performed by: OBSTETRICS & GYNECOLOGY

## 2024-06-28 PROCEDURE — 4500000002 HC ER NO CHARGE

## 2024-06-28 PROCEDURE — 7100000000 HC PACU RECOVERY - FIRST 15 MIN

## 2024-06-28 PROCEDURE — 86850 RBC ANTIBODY SCREEN: CPT

## 2024-06-28 PROCEDURE — 86592 SYPHILIS TEST NON-TREP QUAL: CPT

## 2024-06-28 PROCEDURE — 86901 BLOOD TYPING SEROLOGIC RH(D): CPT

## 2024-06-28 PROCEDURE — 36415 COLL VENOUS BLD VENIPUNCTURE: CPT

## 2024-06-28 PROCEDURE — 7210000100 HC LABOR FEE PER 1 HR

## 2024-06-28 PROCEDURE — 2580000003 HC RX 258: Performed by: OBSTETRICS & GYNECOLOGY

## 2024-06-28 PROCEDURE — 6370000000 HC RX 637 (ALT 250 FOR IP): Performed by: OBSTETRICS & GYNECOLOGY

## 2024-06-28 PROCEDURE — 7100000001 HC PACU RECOVERY - ADDTL 15 MIN

## 2024-06-28 PROCEDURE — 59409 OBSTETRICAL CARE: CPT | Performed by: OBSTETRICS & GYNECOLOGY

## 2024-06-28 PROCEDURE — 1120000000 HC RM PRIVATE OB

## 2024-06-28 RX ORDER — SODIUM CHLORIDE 0.9 % (FLUSH) 0.9 %
5-40 SYRINGE (ML) INJECTION PRN
Status: DISCONTINUED | OUTPATIENT
Start: 2024-06-28 | End: 2024-06-30 | Stop reason: HOSPADM

## 2024-06-28 RX ORDER — IBUPROFEN 800 MG/1
800 TABLET ORAL EVERY 8 HOURS PRN
Status: DISCONTINUED | OUTPATIENT
Start: 2024-06-28 | End: 2024-06-28

## 2024-06-28 RX ORDER — FERROUS GLUCONATE 324(38)MG
324 TABLET ORAL
COMMUNITY

## 2024-06-28 RX ORDER — SODIUM CHLORIDE, SODIUM LACTATE, POTASSIUM CHLORIDE, AND CALCIUM CHLORIDE .6; .31; .03; .02 G/100ML; G/100ML; G/100ML; G/100ML
500 INJECTION, SOLUTION INTRAVENOUS PRN
OUTPATIENT
Start: 2024-06-28

## 2024-06-28 RX ORDER — ONDANSETRON 4 MG/1
4 TABLET, ORALLY DISINTEGRATING ORAL EVERY 6 HOURS PRN
Status: DISCONTINUED | OUTPATIENT
Start: 2024-06-28 | End: 2024-06-30 | Stop reason: HOSPADM

## 2024-06-28 RX ORDER — MODIFIED LANOLIN
OINTMENT (GRAM) TOPICAL PRN
Status: DISCONTINUED | OUTPATIENT
Start: 2024-06-28 | End: 2024-06-30 | Stop reason: HOSPADM

## 2024-06-28 RX ORDER — SODIUM CHLORIDE, SODIUM LACTATE, POTASSIUM CHLORIDE, CALCIUM CHLORIDE 600; 310; 30; 20 MG/100ML; MG/100ML; MG/100ML; MG/100ML
INJECTION, SOLUTION INTRAVENOUS CONTINUOUS
Status: DISCONTINUED | OUTPATIENT
Start: 2024-06-28 | End: 2024-06-28

## 2024-06-28 RX ORDER — SODIUM CHLORIDE 0.9 % (FLUSH) 0.9 %
5-40 SYRINGE (ML) INJECTION PRN
Status: DISCONTINUED | OUTPATIENT
Start: 2024-06-28 | End: 2024-06-28

## 2024-06-28 RX ORDER — CARBOPROST TROMETHAMINE 250 UG/ML
250 INJECTION, SOLUTION INTRAMUSCULAR PRN
Status: DISCONTINUED | OUTPATIENT
Start: 2024-06-28 | End: 2024-06-30 | Stop reason: HOSPADM

## 2024-06-28 RX ORDER — SODIUM CHLORIDE 9 MG/ML
INJECTION, SOLUTION INTRAVENOUS PRN
Status: DISCONTINUED | OUTPATIENT
Start: 2024-06-28 | End: 2024-06-30 | Stop reason: HOSPADM

## 2024-06-28 RX ORDER — IBUPROFEN 800 MG/1
800 TABLET ORAL EVERY 8 HOURS SCHEDULED
Status: DISCONTINUED | OUTPATIENT
Start: 2024-06-28 | End: 2024-06-30 | Stop reason: HOSPADM

## 2024-06-28 RX ORDER — SENNA AND DOCUSATE SODIUM 50; 8.6 MG/1; MG/1
1 TABLET, FILM COATED ORAL DAILY
Status: DISCONTINUED | OUTPATIENT
Start: 2024-06-28 | End: 2024-06-30 | Stop reason: HOSPADM

## 2024-06-28 RX ORDER — SODIUM CHLORIDE 9 MG/ML
25 INJECTION, SOLUTION INTRAVENOUS PRN
Status: DISCONTINUED | OUTPATIENT
Start: 2024-06-28 | End: 2024-06-28

## 2024-06-28 RX ORDER — ACETAMINOPHEN 325 MG/1
650 TABLET ORAL EVERY 4 HOURS PRN
Status: DISCONTINUED | OUTPATIENT
Start: 2024-06-28 | End: 2024-06-28

## 2024-06-28 RX ORDER — OXYCODONE HYDROCHLORIDE 5 MG/1
5 TABLET ORAL EVERY 4 HOURS PRN
Status: DISCONTINUED | OUTPATIENT
Start: 2024-06-28 | End: 2024-06-30 | Stop reason: HOSPADM

## 2024-06-28 RX ORDER — SODIUM CHLORIDE, SODIUM LACTATE, POTASSIUM CHLORIDE, CALCIUM CHLORIDE 600; 310; 30; 20 MG/100ML; MG/100ML; MG/100ML; MG/100ML
INJECTION, SOLUTION INTRAVENOUS CONTINUOUS
Status: DISCONTINUED | OUTPATIENT
Start: 2024-06-28 | End: 2024-06-29

## 2024-06-28 RX ORDER — FENTANYL CITRATE 50 UG/ML
50 INJECTION, SOLUTION INTRAMUSCULAR; INTRAVENOUS ONCE
Status: DISCONTINUED | OUTPATIENT
Start: 2024-06-28 | End: 2024-06-30 | Stop reason: HOSPADM

## 2024-06-28 RX ORDER — METHYLERGONOVINE MALEATE 0.2 MG/ML
200 INJECTION INTRAVENOUS PRN
Status: DISCONTINUED | OUTPATIENT
Start: 2024-06-28 | End: 2024-06-30 | Stop reason: HOSPADM

## 2024-06-28 RX ORDER — FERROUS SULFATE 325(65) MG
325 TABLET ORAL 2 TIMES DAILY WITH MEALS
Status: DISCONTINUED | OUTPATIENT
Start: 2024-06-28 | End: 2024-06-30 | Stop reason: HOSPADM

## 2024-06-28 RX ORDER — ONDANSETRON 2 MG/ML
4 INJECTION INTRAMUSCULAR; INTRAVENOUS EVERY 6 HOURS PRN
Status: DISCONTINUED | OUTPATIENT
Start: 2024-06-28 | End: 2024-06-30 | Stop reason: HOSPADM

## 2024-06-28 RX ORDER — MISOPROSTOL 200 UG/1
400 TABLET ORAL PRN
Status: DISCONTINUED | OUTPATIENT
Start: 2024-06-28 | End: 2024-06-30 | Stop reason: HOSPADM

## 2024-06-28 RX ORDER — ACETAMINOPHEN 500 MG
1000 TABLET ORAL EVERY 8 HOURS SCHEDULED
Status: DISCONTINUED | OUTPATIENT
Start: 2024-06-29 | End: 2024-06-30 | Stop reason: HOSPADM

## 2024-06-28 RX ORDER — SODIUM CHLORIDE 0.9 % (FLUSH) 0.9 %
5-40 SYRINGE (ML) INJECTION EVERY 12 HOURS SCHEDULED
Status: DISCONTINUED | OUTPATIENT
Start: 2024-06-28 | End: 2024-06-28

## 2024-06-28 RX ORDER — OXYCODONE HYDROCHLORIDE 10 MG/1
10 TABLET ORAL EVERY 4 HOURS PRN
Status: DISCONTINUED | OUTPATIENT
Start: 2024-06-28 | End: 2024-06-30 | Stop reason: HOSPADM

## 2024-06-28 RX ORDER — ACETAMINOPHEN 500 MG
1000 TABLET ORAL EVERY 8 HOURS SCHEDULED
Status: DISCONTINUED | OUTPATIENT
Start: 2024-06-28 | End: 2024-06-28

## 2024-06-28 RX ORDER — DOCUSATE SODIUM 100 MG/1
100 CAPSULE, LIQUID FILLED ORAL 2 TIMES DAILY
Status: DISCONTINUED | OUTPATIENT
Start: 2024-06-28 | End: 2024-06-30 | Stop reason: HOSPADM

## 2024-06-28 RX ORDER — SIMETHICONE 80 MG
80 TABLET,CHEWABLE ORAL EVERY 6 HOURS PRN
Status: DISCONTINUED | OUTPATIENT
Start: 2024-06-28 | End: 2024-06-30 | Stop reason: HOSPADM

## 2024-06-28 RX ORDER — SODIUM CHLORIDE 0.9 % (FLUSH) 0.9 %
5-40 SYRINGE (ML) INJECTION EVERY 12 HOURS SCHEDULED
Status: DISCONTINUED | OUTPATIENT
Start: 2024-06-28 | End: 2024-06-30 | Stop reason: HOSPADM

## 2024-06-28 RX ADMIN — SODIUM CHLORIDE, POTASSIUM CHLORIDE, SODIUM LACTATE AND CALCIUM CHLORIDE: 600; 310; 30; 20 INJECTION, SOLUTION INTRAVENOUS at 03:35

## 2024-06-28 RX ADMIN — SODIUM CHLORIDE, PRESERVATIVE FREE 10 ML: 5 INJECTION INTRAVENOUS at 20:20

## 2024-06-28 RX ADMIN — IBUPROFEN 800 MG: 800 TABLET, FILM COATED ORAL at 20:20

## 2024-06-28 RX ADMIN — Medication 166.7 ML: at 03:30

## 2024-06-28 RX ADMIN — DOCUSATE SODIUM 50 MG AND SENNOSIDES 8.6 MG 1 TABLET: 8.6; 5 TABLET, FILM COATED ORAL at 08:39

## 2024-06-28 RX ADMIN — Medication 87.3 MILLI-UNITS/MIN: at 03:41

## 2024-06-28 RX ADMIN — FERROUS SULFATE TAB 325 MG (65 MG ELEMENTAL FE) 325 MG: 325 (65 FE) TAB at 17:58

## 2024-06-28 RX ADMIN — IBUPROFEN 800 MG: 800 TABLET, FILM COATED ORAL at 04:20

## 2024-06-28 RX ADMIN — ACETAMINOPHEN 1000 MG: 500 TABLET ORAL at 06:50

## 2024-06-28 RX ADMIN — Medication: at 12:59

## 2024-06-28 RX ADMIN — DOCUSATE SODIUM 100 MG: 100 CAPSULE, LIQUID FILLED ORAL at 20:20

## 2024-06-28 RX ADMIN — FERROUS SULFATE TAB 325 MG (65 MG ELEMENTAL FE) 325 MG: 325 (65 FE) TAB at 08:39

## 2024-06-28 RX ADMIN — SODIUM CHLORIDE, PRESERVATIVE FREE 10 ML: 5 INJECTION INTRAVENOUS at 08:45

## 2024-06-28 RX ADMIN — DOCUSATE SODIUM 100 MG: 100 CAPSULE, LIQUID FILLED ORAL at 08:39

## 2024-06-28 ASSESSMENT — PAIN SCALES - GENERAL
PAINLEVEL_OUTOF10: 0
PAINLEVEL_OUTOF10: 1
PAINLEVEL_OUTOF10: 2
PAINLEVEL_OUTOF10: 0
PAINLEVEL_OUTOF10: 2
PAINLEVEL_OUTOF10: 0

## 2024-06-28 ASSESSMENT — PAIN DESCRIPTION - ORIENTATION: ORIENTATION: LOWER;MID

## 2024-06-28 ASSESSMENT — PAIN - FUNCTIONAL ASSESSMENT
PAIN_FUNCTIONAL_ASSESSMENT: ACTIVITIES ARE NOT PREVENTED
PAIN_FUNCTIONAL_ASSESSMENT: ACTIVITIES ARE NOT PREVENTED

## 2024-06-28 ASSESSMENT — PAIN DESCRIPTION - DESCRIPTORS
DESCRIPTORS: CRAMPING
DESCRIPTORS: CRAMPING

## 2024-06-28 ASSESSMENT — PAIN DESCRIPTION - LOCATION
LOCATION: ABDOMEN
LOCATION: ABDOMEN

## 2024-06-28 NOTE — H&P
History & Physical    Name: Jaz Vega MRN: [id number]  SSN: [id number]    YOB: 1995  Age: 28 y.o.  Sex: female      Subjective:     Reason for Admission:  Pregnancy and Contractions    History of Present Illness: Jaz Vega is a 28 y.o.  female with an estimated gestational age of 40w6d with Estimated Date of Delivery: 24. Patient complains of severe contractions for 1 days. Pregnancy has been complicated by  None .  Patient denies chest pain, fever, headache , nausea and vomiting, right upper quadrant pain  , shortness of breath, swelling, vaginal bleeding , vaginal leaking of fluid , and visual disturbances.    OB History          4    Para        Term   3            AB        Living   3         SAB        IAB        Ectopic        Molar        Multiple        Live Births                  Past Medical History:   Diagnosis Date    Abnormal Papanicolaou smear of cervix     Acne     Anemia     Depression     v Hx medications prior to pregnancy     No past surgical history on file.  Social History     Occupational History    Not on file   Tobacco Use    Smoking status: Former     Types: Pipe    Smokeless tobacco: Never   Vaping Use    Vaping Use: Former    Substances: Nicotine, THC    Devices: Disposable, Pre-filled or refillable cartridge   Substance and Sexual Activity    Alcohol use: No    Drug use: No    Sexual activity: Not on file     Family History   Problem Relation Age of Onset    Heart Disease Maternal Grandmother        No Known Allergies  Prior to Admission medications    Medication Sig Start Date End Date Taking? Authorizing Provider   ferrous gluconate (FERGON) 324 (38 Fe) MG tablet Take 1 tablet by mouth daily (with breakfast)   Yes Provider, Historical, MD        Review of Systems   Constitutional: Negative.    HENT: Negative.     Eyes: Negative.    Respiratory: Negative.     Cardiovascular: Negative.    Gastrointestinal: Negative.

## 2024-06-28 NOTE — PROGRESS NOTES
bedside shift report given by Rylie Monahan RN at bedside. Plan of care reviewed with patient.    fundal assessment completed noted fundus deviated to the right with small bleeding. Pt stated that she had just voided. Fundus firm.      Steffi GRAVES reassessed fundus, midline after massage. Educated patient on fundus position, denies any uterine fibroids. Stated that she was laying on her left side previously.        fundus midline firm and +1, patient resting comfortable with      2200 pt sleeping at this time     0407 pt comfortable no complaints of pain.

## 2024-06-28 NOTE — L&D DELIVERY NOTE
Paco Vega [[id number]]      Labor Events     Labor: No  Cervical Ripening Date/Time:      Antibiotics Received during Labor: No  Rupture Date/Time:  24 03:08:00   Rupture Type: SROM  Fluid Color: Meconium  Meconium Consistency: Moderate  Fluid Odor: None  Fluid Volume: Moderate  Augmentation: None  Labor Complications: None              Anesthesia    Method: None       Delivery Details      Delivery Date: 24 Delivery Time: 03:13:00   Delivery Type: Vaginal, Spontaneous               Presentation    Presentation: Vertex  _: Occiput  _: Anterior       Shoulder Dystocia    Shoulder Dystocia Present?: No       Assisted Delivery Details    Forceps Attempted?: No  Vacuum Extractor Attempted?: No                           Cord    Vessels: 3 Vessels  Complications: Nuchal Loose  Cord Around: Head  Delayed Cord Clamping?: Yes  Cord Clamped Date/Time: 2024 03:14:00  Cord Blood Disposition: Lab  Gases Sent?: No              Placenta    Date/Time: 2024 03:30:00  Removal: Expressed  Appearance: Intact  Disposition: Discarded       Lacerations    Episiotomy: None  Perineal Lacerations: 1st  Other Lacerations: no non-perineal laceration       Vaginal Counts    Initial Count Personnel: HAO GUTHRIE RN  Initial Count Verified By: DR. WADE  Intial Sponge Count: Correct Intial Needles Count: Correct Intial Instruments Count: Correct   Final Sponges Count: Correct Final Needles  Count: Correct Final Instruments Count: Correct   Final Count Personnel: NEVILLE SELLERS RN  Final Count Verified By: GOYO PUGH RN  Accurate Final Count?: Yes       Blood Loss  Mother: Jaz Vega T #[id number]     Start of Mother's Information      Delivery Blood Loss  24 1513 - 24 0349      None                 End of Mother's Information  Mother: Jaz Vega T #[id number]                Delivery Providers    Delivering clinician: Doc Wade MD     Provider Role

## 2024-06-28 NOTE — PROGRESS NOTES
Ed called reporting  pt coming via EMS in labor  billy every minute and requesting LD Rns in Ed. Rn x 4 went to Ed for possible delivery. Dr. JOSIAH Keys was made aware. ON pt's arrival to Ed trauma 1 SVE was done and revealed 8/80 bulging bag. Pt was brought to LD 5. Pt SROM @ 0308 meconium fluid, Dr. Johnson called to bedside. Pt had  @0313 of live male  bulb suction and simulation done apgar score 8/9/9 were assigned. Placenta delivered @ 0330, 1st degree perineal laceration was sustained per MD that did not need repair. Recovery stated @ 0335.

## 2024-06-29 LAB
BASOPHILS # BLD: 0 K/UL (ref 0–0.1)
BASOPHILS NFR BLD: 0 % (ref 0–1)
DIFFERENTIAL METHOD BLD: ABNORMAL
EOSINOPHIL # BLD: 0.1 K/UL (ref 0–0.4)
EOSINOPHIL NFR BLD: 1 % (ref 0–7)
ERYTHROCYTE [DISTWIDTH] IN BLOOD BY AUTOMATED COUNT: 18.8 % (ref 11.5–14.5)
HCT VFR BLD AUTO: 27.2 % (ref 35–47)
HGB BLD-MCNC: 8.2 G/DL (ref 11.5–16)
IMM GRANULOCYTES # BLD AUTO: 0 K/UL (ref 0–0.04)
IMM GRANULOCYTES NFR BLD AUTO: 0 % (ref 0–0.5)
LYMPHOCYTES # BLD: 2.5 K/UL (ref 0.8–3.5)
LYMPHOCYTES NFR BLD: 31 % (ref 12–49)
MCH RBC QN AUTO: 23.4 PG (ref 26–34)
MCHC RBC AUTO-ENTMCNC: 30.1 G/DL (ref 30–36.5)
MCV RBC AUTO: 77.5 FL (ref 80–99)
MONOCYTES # BLD: 0.6 K/UL (ref 0–1)
MONOCYTES NFR BLD: 8 % (ref 5–13)
NEUTS SEG # BLD: 4.9 K/UL (ref 1.8–8)
NEUTS SEG NFR BLD: 60 % (ref 32–75)
NRBC # BLD: 0 K/UL (ref 0–0.01)
NRBC BLD-RTO: 0 PER 100 WBC
PLATELET # BLD AUTO: 148 K/UL (ref 150–400)
RBC # BLD AUTO: 3.51 M/UL (ref 3.8–5.2)
WBC # BLD AUTO: 8.1 K/UL (ref 3.6–11)

## 2024-06-29 PROCEDURE — 6370000000 HC RX 637 (ALT 250 FOR IP): Performed by: OBSTETRICS & GYNECOLOGY

## 2024-06-29 PROCEDURE — 85025 COMPLETE CBC W/AUTO DIFF WBC: CPT

## 2024-06-29 PROCEDURE — 99213 OFFICE O/P EST LOW 20 MIN: CPT

## 2024-06-29 PROCEDURE — 1120000000 HC RM PRIVATE OB

## 2024-06-29 PROCEDURE — 36415 COLL VENOUS BLD VENIPUNCTURE: CPT

## 2024-06-29 RX ADMIN — IBUPROFEN 800 MG: 800 TABLET, FILM COATED ORAL at 21:19

## 2024-06-29 RX ADMIN — DOCUSATE SODIUM 100 MG: 100 CAPSULE, LIQUID FILLED ORAL at 08:43

## 2024-06-29 RX ADMIN — DOCUSATE SODIUM 100 MG: 100 CAPSULE, LIQUID FILLED ORAL at 21:19

## 2024-06-29 RX ADMIN — FERROUS SULFATE TAB 325 MG (65 MG ELEMENTAL FE) 325 MG: 325 (65 FE) TAB at 17:20

## 2024-06-29 RX ADMIN — ACETAMINOPHEN 1000 MG: 500 TABLET ORAL at 17:20

## 2024-06-29 RX ADMIN — IBUPROFEN 800 MG: 800 TABLET, FILM COATED ORAL at 14:42

## 2024-06-29 RX ADMIN — FERROUS SULFATE TAB 325 MG (65 MG ELEMENTAL FE) 325 MG: 325 (65 FE) TAB at 08:43

## 2024-06-29 RX ADMIN — ACETAMINOPHEN 1000 MG: 500 TABLET ORAL at 08:43

## 2024-06-29 NOTE — PROGRESS NOTES
0720-Bedside shift report rcvd from ABNER Ward RN. MD rounded at this time.   0835-MD at bedside, pt reported needing resources for discharge, including crib/basinet.   0915-VM left with Case Management department for consult.   0942-Lab contacted for CBC.   1400-Pt showering and bed changed at this time.   1920-Bedside shift report given to RYAN Dhaliwal RN.

## 2024-06-29 NOTE — PROGRESS NOTES
POSTPARTUM DAY 1 NOTE    SUBJECTIVE:    Patient comfortable without complaint. She is breastfeeding. She is voiding without difficulty with normal lochia. Ambulating without any problems    Method of delivery: normal spontaneous vaginal delivery  She is breast-feeding and is not experiencing problems.  Pregnancy complications: none.  She is feeling well.  She currently uses no method for contraception.      OBJECTIVE:  /67   Pulse 61   Temp 97.7 °F (36.5 °C) (Oral)   Resp 18   Ht 1.575 m (5' 2\")   Wt 63.5 kg (140 lb)   LMP 2023   SpO2 100%   Breastfeeding Unknown   BMI 25.61 kg/m²     Physical Exam:     Abdomen: soft, non tender, Fundus firm at umbilicus  Extremities: No calf tenderness    ASSESSMENT:   S/p  PPD #1 doing well    PLAN:      History of Depression: Will need postpartum depression screening  Will discuss contraception on day of discharge. Patient was tired this morning  Patient stable. Continue current management

## 2024-06-30 VITALS
HEART RATE: 68 BPM | RESPIRATION RATE: 18 BRPM | OXYGEN SATURATION: 99 % | TEMPERATURE: 97.7 F | SYSTOLIC BLOOD PRESSURE: 110 MMHG | HEIGHT: 62 IN | BODY MASS INDEX: 25.76 KG/M2 | DIASTOLIC BLOOD PRESSURE: 76 MMHG | WEIGHT: 140 LBS

## 2024-06-30 PROBLEM — O47.9 IRREGULAR UTERINE CONTRACTIONS: Status: RESOLVED | Noted: 2024-03-18 | Resolved: 2024-06-30

## 2024-06-30 PROBLEM — R10.9 ABDOMINAL PAIN AFFECTING PREGNANCY: Status: RESOLVED | Noted: 2024-06-28 | Resolved: 2024-06-30

## 2024-06-30 PROBLEM — Z64.1 MULTIPARITY: Status: RESOLVED | Noted: 2024-03-18 | Resolved: 2024-06-30

## 2024-06-30 PROBLEM — O26.899 ABDOMINAL PAIN AFFECTING PREGNANCY: Status: RESOLVED | Noted: 2024-06-28 | Resolved: 2024-06-30

## 2024-06-30 PROBLEM — Z3A.32 32 WEEKS GESTATION OF PREGNANCY: Status: RESOLVED | Noted: 2024-03-18 | Resolved: 2024-06-30

## 2024-06-30 PROBLEM — O09.33 NO PRENATAL CARE IN CURRENT PREGNANCY IN THIRD TRIMESTER: Status: RESOLVED | Noted: 2024-03-18 | Resolved: 2024-06-30

## 2024-06-30 PROCEDURE — 6370000000 HC RX 637 (ALT 250 FOR IP): Performed by: OBSTETRICS & GYNECOLOGY

## 2024-06-30 PROCEDURE — 99024 POSTOP FOLLOW-UP VISIT: CPT | Performed by: OBSTETRICS & GYNECOLOGY

## 2024-06-30 RX ORDER — IBUPROFEN 800 MG/1
800 TABLET ORAL EVERY 8 HOURS PRN
Qty: 30 TABLET | Refills: 0 | Status: SHIPPED | OUTPATIENT
Start: 2024-06-30 | End: 2024-07-10

## 2024-06-30 RX ADMIN — FERROUS SULFATE TAB 325 MG (65 MG ELEMENTAL FE) 325 MG: 325 (65 FE) TAB at 17:27

## 2024-06-30 RX ADMIN — ACETAMINOPHEN 1000 MG: 500 TABLET ORAL at 01:09

## 2024-06-30 RX ADMIN — DOCUSATE SODIUM 50 MG AND SENNOSIDES 8.6 MG 1 TABLET: 8.6; 5 TABLET, FILM COATED ORAL at 08:40

## 2024-06-30 RX ADMIN — ACETAMINOPHEN 1000 MG: 500 TABLET ORAL at 08:57

## 2024-06-30 RX ADMIN — FERROUS SULFATE TAB 325 MG (65 MG ELEMENTAL FE) 325 MG: 325 (65 FE) TAB at 08:40

## 2024-06-30 RX ADMIN — ACETAMINOPHEN 1000 MG: 500 TABLET ORAL at 17:27

## 2024-06-30 RX ADMIN — DOCUSATE SODIUM 100 MG: 100 CAPSULE, LIQUID FILLED ORAL at 08:40

## 2024-06-30 RX ADMIN — IBUPROFEN 800 MG: 800 TABLET, FILM COATED ORAL at 14:11

## 2024-06-30 ASSESSMENT — PAIN DESCRIPTION - DESCRIPTORS: DESCRIPTORS: ACHING

## 2024-06-30 ASSESSMENT — PAIN DESCRIPTION - LOCATION: LOCATION: HEAD

## 2024-06-30 NOTE — PLAN OF CARE
Problem: Postpartum  Goal: Experiences normal postpartum course  Description:  Postpartum OB-Pregnancy care plan goal which identifies if the mother is experiencing a normal postpartum course  2024 by Rosalina Colorado RN  Outcome: Progressing  Flowsheets (Taken 2024)  Experiences Normal Postpartum Course:   Assess uterine involution   Monitor maternal vital signs  2024 by Vivi Ward RN  Outcome: Progressing  Goal: Appropriate maternal -  bonding  Description:  Postpartum OB-Pregnancy care plan goal which identifies if the mother and  are bonding appropriately  2024 by Vivi Ward RN  Outcome: Progressing  Goal: Establishment of infant feeding pattern  Description:  Postpartum OB-Pregnancy care plan goal which identifies if the mother is establishing a feeding pattern with their   2024 by Vivi Ward RN  Outcome: Progressing  Goal: Incisions, wounds, or drain sites healing without S/S of infection  2024 by Vivi Ward RN  Outcome: Progressing     Problem: Pain  Goal: Verbalizes/displays adequate comfort level or baseline comfort level  2024 by Rosalina Colorado RN  Outcome: Progressing  Flowsheets (Taken 2024 by Rylie Monahan, RN)  Verbalizes/displays adequate comfort level or baseline comfort level:   Encourage patient to monitor pain and request assistance   Assess pain using appropriate pain scale   Administer analgesics based on type and severity of pain and evaluate response   Implement non-pharmacological measures as appropriate and evaluate response   Consider cultural and social influences on pain and pain management   Notify Licensed Independent Practitioner if interventions unsuccessful or patient reports new pain  2024 by Vivi Ward RN  Outcome: Progressing     Problem: Infection - Adult  Goal: Absence of infection at discharge  2024 by Rosalina Colorado 
  Problem: Vaginal Birth or  Section  Goal: Fetal and maternal status remain reassuring during the birth process  Description:  Birth OB-Pregnancy care plan goal which identifies if the fetal and maternal status remain reassuring during the birth process  2024 05 by Selam Reynoso RN  Outcome: Completed     Problem: Postpartum  Goal: Experiences normal postpartum course  Description:  Postpartum OB-Pregnancy care plan goal which identifies if the mother is experiencing a normal postpartum course  2024 by Vivi Ward RN  Outcome: Progressing  2024 by Rylie Monahan RN  Outcome: /hospitals Progressing  Flowsheets (Taken 2024 0739)  Experiences Normal Postpartum Course:   Monitor maternal vital signs   Assess uterine involution  2024 by Selam Reynoso RN  Outcome: Progressing  Goal: Appropriate maternal -  bonding  Description:  Postpartum OB-Pregnancy care plan goal which identifies if the mother and  are bonding appropriately  2024 by Vivi Ward RN  Outcome: Progressing  2024 by Rylie Monahan RN  Outcome: /hospitals Progressing  2024 05 by Selam Reynoso RN  Outcome: Progressing  Goal: Establishment of infant feeding pattern  Description:  Postpartum OB-Pregnancy care plan goal which identifies if the mother is establishing a feeding pattern with their   2024 by Vivi Ward RN  Outcome: Progressing  2024 by Rylie Monahan RN  Outcome: /hospitals Progressing  Flowsheets (Taken 2024 0739)  Establishment of Infant Feeding Pattern: Assess breast/bottle feeding  2024 by Selam Reynoso RN  Outcome: Progressing  Goal: Incisions, wounds, or drain sites healing without S/S of infection  2024 by Vivi Ward RN  Outcome: Progressing  2024 by Rylie Monahan RN  Outcome: /hospitals 
  Problem: Vaginal Birth or  Section  Goal: Fetal and maternal status remain reassuring during the birth process  Description:  Birth OB-Pregnancy care plan goal which identifies if the fetal and maternal status remain reassuring during the birth process  Outcome: Completed     Problem: Postpartum  Goal: Experiences normal postpartum course  Description:  Postpartum OB-Pregnancy care plan goal which identifies if the mother is experiencing a normal postpartum course  Outcome: Progressing  Goal: Appropriate maternal -  bonding  Description:  Postpartum OB-Pregnancy care plan goal which identifies if the mother and  are bonding appropriately  Outcome: Progressing  Goal: Establishment of infant feeding pattern  Description:  Postpartum OB-Pregnancy care plan goal which identifies if the mother is establishing a feeding pattern with their   Outcome: Progressing  Goal: Incisions, wounds, or drain sites healing without S/S of infection  Outcome: Progressing     Problem: Pain  Goal: Verbalizes/displays adequate comfort level or baseline comfort level  Outcome: Progressing     Problem: Infection - Adult  Goal: Absence of infection at discharge  Outcome: Progressing  Flowsheets (Taken 2024)  Absence of infection at discharge:   Assess and monitor for signs and symptoms of infection   Monitor all insertion sites i.e., indwelling lines, tubes and drains   Monitor lab/diagnostic results   [place] appropriate cooling/warming therapies per order   Administer medications as ordered   Instruct and encourage patient and family to use good hand hygiene technique   Identify and instruct in appropriate isolation precautions for identified infection/condition  Goal: Absence of infection during hospitalization  Outcome: Progressing  Flowsheets (Taken 2024)  Absence of infection during hospitalization:   Assess and monitor for signs and symptoms of infection   Monitor lab/diagnostic 
1205- Pt educated on discharge instructions and prescriptions sent to preferred pharmacy. Aware of when next dose can be taken and warning signs to watch for/ notify MD. Discharge plan of care validated with provider discharge order.        Problem: Postpartum  Goal: Experiences normal postpartum course  Description:  Postpartum OB-Pregnancy care plan goal which identifies if the mother is experiencing a normal postpartum course  2024 by Kelly Owusu RN  Outcome: Progressing  Flowsheets (Taken 2024 0843)  Experiences Normal Postpartum Course:   Monitor maternal vital signs   Assess uterine involution  2024 by Shayna Vega RN  Outcome: Progressing  Goal: Appropriate maternal -  bonding  Description:  Postpartum OB-Pregnancy care plan goal which identifies if the mother and  are bonding appropriately  2024 by Kelly Owusu RN  Outcome: Progressing  2024 by Shayna Vega RN  Outcome: Progressing  Goal: Establishment of infant feeding pattern  Description:  Postpartum OB-Pregnancy care plan goal which identifies if the mother is establishing a feeding pattern with their   2024 by Kelly Owusu RN  Outcome: Progressing  Flowsheets (Taken 2024 0843)  Establishment of Infant Feeding Pattern: Assess breast/bottle feeding  2024 by Shayna Vega RN  Outcome: Progressing  Goal: Incisions, wounds, or drain sites healing without S/S of infection  2024 by Kelly Owusu RN  Outcome: Progressing  2024 by Shayna Vega RN  Outcome: Progressing     Problem: Pain  Goal: Verbalizes/displays adequate comfort level or baseline comfort level  2024 by Kelly Owusu RN  Outcome: Progressing  Flowsheets (Taken 2024 0843)  Verbalizes/displays adequate comfort level or baseline comfort level:   Encourage patient to monitor pain and request assistance   Assess pain using appropriate pain scale   Administer 
color   [place] appropriate cooling/warming therapies per order   Administer medications as ordered   Instruct and encourage patient and family to use good hand hygiene technique   Identify and instruct in appropriate isolation precautions for identified infection/condition  Goal: Absence of fever/infection during anticipated neutropenic period  6/28/2024 0847 by Rylie Monahan RN  Outcome: /[facility] Progressing  6/28/2024 0547 by Selam Reynoso RN  Outcome: Progressing     Problem: Safety - Adult  Goal: Free from fall injury  6/28/2024 0847 by Rylie Monahan RN  Outcome: /[facility] Progressing  Flowsheets (Taken 6/28/2024 0739)  Free From Fall Injury:   Instruct family/caregiver on patient safety   Based on caregiver fall risk screen, instruct family/caregiver to ask for assistance with transferring infant if caregiver noted to have fall risk factors  6/28/2024 0547 by Selam Reynoso RN  Outcome: Progressing     Problem: Discharge Planning  Goal: Discharge to home or other facility with appropriate resources  6/28/2024 0847 by Rylie Monahan RN  Outcome: /[facility] Progressing  6/28/2024 0547 by Selam Reynoso RN  Outcome: Progressing     Problem: Chronic Conditions and Co-morbidities  Goal: Patient's chronic conditions and co-morbidity symptoms are monitored and maintained or improved  6/28/2024 0847 by Rylie Monahan RN  Outcome: /[facility] Progressing  6/28/2024 0547 by Selam Reynoso RN  Outcome: Progressing  Flowsheets (Taken 6/28/2024 0356)  Care Plan - Patient's Chronic Conditions and Co-Morbidity Symptoms are Monitored and Maintained or Improved:   Monitor and assess patient's chronic conditions and comorbid symptoms for stability, deterioration, or improvement   Collaborate with multidisciplinary team to address chronic and comorbid conditions and prevent exacerbation or deterioration

## 2024-06-30 NOTE — CARE COORDINATION
CM acknowledge consult for suellen or janie.  [facility] will supply this to the babys father tomorrow morning per nursing staff.  No other needs from  at this time.

## 2024-06-30 NOTE — DISCHARGE INSTRUCTIONS
your use of this information.          Understanding Postpartum Depression (02:47)  Your health professional recommends that you watch this short online health video.  Learn about postpartum depression and how it's treated.   Purpose: Explains how to recognize and treat postpartum depression.  Goal: User will learn about postpartum depression and how it can be treated.    Watch: Scan the QR code or visit the link to view video       [email or web address]  Current as of: June 24, 2023  Content Version: 14.1  © 2006-2024 [employer].   Care instructions adapted under license by [email or web address]. If you have questions about a medical condition or this instruction, always ask your healthcare professional. [employer] disclaims any warranty or liability for your use of this information.

## 2024-06-30 NOTE — DISCHARGE SUMMARY
Patient ID:[id number]     DISCHARGE SUMMARY    ADMITTING DIAGNOSIS:  Pregnancy at term in labor  No prenatal care    DISCHARGE DIAGNOSIS:  Pregnancy at term delivered    PROCEDURES PERFORMED: Vaginal delivery    HISTORY OF PRESENT ILLNESS: 28 years old patient admitted with pregnancy at term in labor who eventually had a normal, uneventful vaginal delivery.  She is actually hemodynamically stable, without complications.    OBJECTIVE:  VITALS:  Vitals:    06/30/24 0707   BP:    Pulse:    Resp: 15   Temp:    SpO2:       HEART: Regular rhythm, no murmur  LUNGS: Clear to auscultation at both fields  ABDOMEN: Soft and depressible, normal bowel sounds  PELVIC: Normal lochia    Results for orders placed or performed during the hospital encounter of 06/28/24   RPR   Result Value Ref Range    RPR NONREACTIVE NONREACTIVE   CBC   Result Value Ref Range    WBC 10.6 3.6 - 11.0 K/uL    RBC 4.24 3.80 - 5.20 M/uL    Hemoglobin 10.1 (L) 11.5 - 16.0 g/dL    Hematocrit 32.2 (L) 35.0 - 47.0 %    MCV 75.9 (L) 80.0 - 99.0 FL    MCH 23.8 (L) 26.0 - 34.0 PG    MCHC 31.4 30.0 - 36.5 g/dL    RDW 18.6 (H) 11.5 - 14.5 %    Platelets 172 150 - 400 K/uL    Nucleated RBCs 0.0 0.0  WBC    nRBC 0.00 0.00 - 0.01 K/uL   CBC with Auto Differential   Result Value Ref Range    WBC 8.1 3.6 - 11.0 K/uL    RBC 3.51 (L) 3.80 - 5.20 M/uL    Hemoglobin 8.2 (L) 11.5 - 16.0 g/dL    Hematocrit 27.2 (L) 35.0 - 47.0 %    MCV 77.5 (L) 80.0 - 99.0 FL    MCH 23.4 (L) 26.0 - 34.0 PG    MCHC 30.1 30.0 - 36.5 g/dL    RDW 18.8 (H) 11.5 - 14.5 %    Platelets 148 (L) 150 - 400 K/uL    Nucleated RBCs 0.0 0.0  WBC    nRBC 0.00 0.00 - 0.01 K/uL    Neutrophils % 60 32 - 75 %    Lymphocytes % 31 12 - 49 %    Monocytes % 8 5 - 13 %    Eosinophils % 1 0 - 7 %    Basophils % 0 0 - 1 %    Immature Granulocytes % 0 0 - 0.5 %    Neutrophils Absolute 4.9 1.8 - 8.0 K/UL    Lymphocytes Absolute 2.5 0.8 - 3.5 K/UL    Monocytes Absolute 0.6 0.0 - 1.0 K/UL    Eosinophils